# Patient Record
Sex: MALE | Race: WHITE | NOT HISPANIC OR LATINO | Employment: FULL TIME | URBAN - METROPOLITAN AREA
[De-identification: names, ages, dates, MRNs, and addresses within clinical notes are randomized per-mention and may not be internally consistent; named-entity substitution may affect disease eponyms.]

---

## 2020-03-13 ENCOUNTER — VIRTUAL VISIT (OUTPATIENT)
Dept: FAMILY MEDICINE | Facility: OTHER | Age: 35
End: 2020-03-13

## 2020-03-13 NOTE — PROGRESS NOTES
"Date: 2020 10:27:32  Clinician: Johnny Mortensen  Clinician NPI: [id number]  Patient: anderson rodas  Patient : 1985  Patient Address: [address] [place], [place] [address]  Patient Phone: [phone]  Visit Protocol: URI  Patient Summary:  anderson is a 34 year old ( : 1985 ) male who initiated a Visit for COVID-19 (Coronavirus) evaluation and screening. When asked the question \"Please sign me up to receive news, health information and promotions. \", anderson responded \"No\".    anderson states his symptoms started gradually 10-13 days ago. After his symptoms started, they improved and then got worse again.   His symptoms consist of chills, a cough, nasal congestion, tooth pain, malaise, a headache, rhinitis, and facial pain or pressure. He is experiencing mild difficulty breathing with activities but can speak normally in full sentences.   Symptom details     Nasal secretions: The color of his mucus is yellow.    Cough: anderson coughs every 5-10 minutes and his cough is more bothersome at night. Phlegm comes into his throat when he coughs. He believes his cough is caused by post-nasal drip. The color of the phlegm is yellow.     Facial pain or pressure: The facial pain or pressure does not feel worse when bending or leaning forward.     Headache: He states the headache is moderate (4-6 on a 10 point pain scale).     Tooth pain: The tooth pain is not caused by a cavity, recent dental work, or other mouth problems.      anderson denies having wheezing, sore throat, fever, ear pain, and myalgias. He also denies taking antibiotic medication for the symptoms, having recent facial or sinus surgery in the past 60 days, and having a sinus infection within the past year.   Precipitating events  He has not recently been exposed to someone with influenza. anderson has not been in close contact with any high risk individuals.   Pertinent COVID-19 (Coronavirus) information  anderson has traveled internationally or to the " areas where COVID-19 (Coronavirus) is widespread in the last 14 days before the start of his symptoms. Countries or locations traveled as reported by the patient (free text): [place] and [place]   anderson has not had close contact with a laboratory-confirmed positive COVID-19 patient or someone under quarantine for suspected COVID-19 within 14 days of symptom onset.   anderson is not a healthcare worker or does not work in a healthcare facility.   Pertinent medical history  anderson does not need a return to work/school note.   Weight: 220 lbs   anderson does not smoke or use smokeless tobacco.   Weight: 220 lbs    MEDICATIONS: No current medications, ALLERGIES: NKDA  Clinician Response:  Dear anderson,   Dear anderson,  Based on the information you have provided, it does not appear you need Coronavirus (COVID-19) testing at this time because you do not have fever and cough which are the primary symptoms of Coronavirus. But your possible exposure to Coronavirus means that we do recommend self-isolation for 14 days from the last day you may have been exposed.   What does this mean?  Isolate Yourself:   Isolate yourself at home.   Do Not allow any visitors  Do Not go to work or school  Do Not go to [employer],  centers, shopping, or other public places.  Do Not shake hands.  Avoid close contact with others (hugging, kissing).   Protect Others:   Cover Your Mouth and Nose with a mask, disposable tissue or wash cloth to avoid spreading germs to others.  Wash your hands and face frequently with soap and water.   If you have not developed a cough with fever by day 15 of isolation you are considered uninfected.  If you develop cough and fever, submit a new visit to us so we can arrange curbside COVID testing.   Thank you for limiting contact with others, wearing a simple mask to cover your cough, practice good hand hygiene habits and accessing our virtual services where possible to limit the spread of this virus.  For more  information about COVID19 and options for caring for yourself at home, please visit the CDC website at https://www.cdc.gov/coronavirus/2019-ncov/about/steps-when-sick.html  For more options for care at [facility], please visit our website at [email or web address]    COVID-19 (Coronavirus) General Information  With the increase in the number of COVID-19 (Coronavirus) cases, we understand you may have some questions. Below is some helpful information on COVID-19 (Coronavirus).  How can I protect myself and others from the COVID-19 (Coronavirus)?  Because there is currently no vaccine to prevent infection, the best way to protect yourself is to avoid being exposed to this virus. Put distance between yourself and other people if COVID-19 (Coronavirus) is spreading in your community. The virus is thought to spread mainly from person-to-person.     Between people who are in close contact with one another (within about 6 about) for prolonged period (10 minutes or longer).    Through respiratory droplets produced when an infected person coughs or sneezes.     The CDC recommends the following additional steps to protect yourself and others:     Wash your hands often with soap and water for at least 20 seconds, especially after blowing your nose, coughing, or sneezing; going to the bathroom; and before eating or preparing food.  Use an alcohol-based hand  that contains at least 60 percent alcohol if soap and water are not available.        Avoid touching your eyes, nose and mouth with unwashed hands.    Avoid close contact with people who are sick.    Stay home when you are sick.    Cover your cough or sneeze with a tissue, then throw the tissue in the trash.    Clean and disinfect frequently touched objects and surfaces.     You can help stop COVID-19 (Coronavirus) by knowing the signs and symptoms:     Fever    Cough    Shortness of breath     Contact your healthcare provider if    Develop symptoms   AND   Have been in close contact with a person known to have COVID-19 (Coronavirus) or live in or have recently traveled from an area with ongoing spread of COVID-19 (Coronavirus). Call ahead before you go to a doctor's office or emergency room. Tell them about your recent travel and your symptoms.   For the most up to date information, visit the CDC's website.  Steps to help prevent the spread of COVID-19 (Coronavirus) if you are sick  If you are sick with COVID-19 (Coronavirus) or suspect you are infected with the virus that causes COVID-19 (Coronavirus), follow the steps below to help prevent the disease from spreading&nbsp;to people in your home and community.     Stay home except to get medical care. Home isolation may be started in consultation with your healthcare clinician.    Separate yourself from other people and animals in your home.    Call ahead before visiting your doctor if you have a medical appointment.    Wear a facemask when you are around other people.    Cover your cough and sneezes.    Clean your hands often.    Avoid sharing personal household items.    Clean and disinfect frequently touched objects and surfaces everyday.    You will need to have someone drop off medications or household supplies (if needed) at your house without coming inside or in contact with you or others living in your house.    Monitor your symptoms and seek prompt medical care if your illness is worsening (e.g. Difficulty breathing).    Discontinue home isolation only in consultation with your healthcare provider.     For more detailed and up to date information on what to do if you are sick, visit this link: What to Do If You Are Sick With Coronavirus Disease 2019 (COVID-19).  Do I need to be tested for COVID-19 (Coronavirus)?     At this time, the limited number of tests available are controlled by the state and local health departments and are being reserved for more seriously ill patients, those  "with known exposure to confirmed patients, and those with recent travel (within 14 days) to countries with high rates of COVID-19 (Coronavirus).    Decisions on which patients receive testing will be based on the local spread of COVID-19 (Coronavirus) as well as the symptoms. Your healthcare provider will make the final decision on whether you should be tested.    In the meantime, if you have concerns that you may have been exposed, it is reasonable to practice \"social distancing.\"&nbsp; If you are ill with a cold or flu-like illness, please monitor your symptoms and reach out to your healthcare provider if your symptoms worsen.    For more up to date information, visit this link: COVID-19 (Coronavirus) Frequently Asked Questions and Answers.      Diagnosis: Acute upper respiratory infection, unspecified  Diagnosis ICD: J06.9  "

## 2021-06-05 ENCOUNTER — HEALTH MAINTENANCE LETTER (OUTPATIENT)
Age: 36
End: 2021-06-05

## 2021-09-25 ENCOUNTER — HEALTH MAINTENANCE LETTER (OUTPATIENT)
Age: 36
End: 2021-09-25

## 2022-02-23 ENCOUNTER — OFFICE VISIT (OUTPATIENT)
Dept: FAMILY MEDICINE | Facility: CLINIC | Age: 37
End: 2022-02-23
Payer: COMMERCIAL

## 2022-02-23 VITALS
SYSTOLIC BLOOD PRESSURE: 112 MMHG | DIASTOLIC BLOOD PRESSURE: 72 MMHG | WEIGHT: 230 LBS | BODY MASS INDEX: 33.97 KG/M2 | OXYGEN SATURATION: 99 % | HEART RATE: 72 BPM

## 2022-02-23 DIAGNOSIS — R06.83 SNORING: ICD-10-CM

## 2022-02-23 DIAGNOSIS — Z23 COVID-19 VACCINE ADMINISTERED: ICD-10-CM

## 2022-02-23 DIAGNOSIS — J01.90 ACUTE SINUSITIS WITH SYMPTOMS > 10 DAYS: Primary | ICD-10-CM

## 2022-02-23 PROCEDURE — 91305 COVID-19,PF,PFIZER (12+ YRS): CPT | Performed by: FAMILY MEDICINE

## 2022-02-23 PROCEDURE — 0054A COVID-19,PF,PFIZER (12+ YRS): CPT | Performed by: FAMILY MEDICINE

## 2022-02-23 PROCEDURE — 99204 OFFICE O/P NEW MOD 45 MIN: CPT | Performed by: FAMILY MEDICINE

## 2022-02-23 RX ORDER — DOXYCYCLINE HYCLATE 100 MG
100 TABLET ORAL 2 TIMES DAILY
Qty: 20 TABLET | Refills: 0 | Status: SHIPPED | OUTPATIENT
Start: 2022-02-23 | End: 2022-03-21

## 2022-02-23 NOTE — PROGRESS NOTES
Assessment & Plan     Acute sinusitis with symptoms > 10 days  Due to ongoing length of time, will trial treatment and see if improvement.  If not then plan for referral to ENT/CT if needed  - doxycycline hyclate (VIBRA-TABS) 100 MG tablet  Dispense: 20 tablet; Refill: 0    Snoring  Referral for sleep eval  - Adult Sleep Eval & Management  Referral    COVID-19 vaccine administered  - COVID-19,PF,PFIZER (12+ Yrs GRAY LABEL)                   Return in about 1 week (around 3/2/2022), or if symptoms worsen or fail to improve.    Celia Chadwick MD  [facility] JUNE Maddox is a 36 year old who presents for the following health issues     History of Present Illness     Reason for visit:  Having sinus issues for awhile and it getting worse not better. Would like to get it checked out.  Symptom onset:  More than a month  Symptoms include:  Blocked sinus  Symptom intensity:  Severe  Symptom progression:  Worsening  Had these symptoms before:  Yes  Has tried/received treatment for these symptoms:  No  What makes it worse:  Dry air  What makes it better:  No    He eats 2-3 servings of fruits and vegetables daily.He consumes 0 sweetened beverage(s) daily.He exercises with enough effort to increase his heart rate 10 to 19 minutes per day.  He exercises with enough effort to increase his heart rate 3 or less days per week.   He is taking medications regularly.     Ongoing for a while and feels like getting worse.  Unable to sleep at night.  Taking mucinex at night.  Gag reflex getting worse - like brushing teeth.  No teeth pain.  + snoring.  Wife tells him stops breathing at times.  When blowing nose will depend on the time of the day - hard in the morning and using saline nasal spray.  Green in color.  No hx of allergies.  Occasionally waking up tired.  Congestion feels like in the maxillary area.  Some pressure.            Review of Systems   Constitutional, HEENT, cardiovascular,  pulmonary, gi and gu systems are negative, except as otherwise noted.      Objective    /72 (BP Location: Right arm, Patient Position: Sitting, Cuff Size: Adult Regular)   Pulse 72   Wt 104.3 kg (230 lb)   SpO2 99%   BMI 33.97 kg/m    Body mass index is 33.97 kg/m .  Physical Exam   GENERAL: healthy, alert and no distress  HENT: ear canals and TM's normal, nose and mouth without ulcers or lesions, turbinates appear enlarged but not boggy   NECK: no adenopathy, no asymmetry, masses, or scars and thyroid normal to palpation  RESP: lungs clear to auscultation - no rales, rhonchi or wheezes  MS: no gross musculoskeletal defects noted, no edema

## 2022-03-09 ENCOUNTER — MYC MEDICAL ADVICE (OUTPATIENT)
Dept: FAMILY MEDICINE | Facility: CLINIC | Age: 37
End: 2022-03-09
Payer: COMMERCIAL

## 2022-03-21 ENCOUNTER — VIRTUAL VISIT (OUTPATIENT)
Dept: FAMILY MEDICINE | Facility: CLINIC | Age: 37
End: 2022-03-21
Payer: COMMERCIAL

## 2022-03-21 DIAGNOSIS — J01.90 SUBACUTE SINUSITIS, UNSPECIFIED LOCATION: Primary | ICD-10-CM

## 2022-03-21 PROCEDURE — 99214 OFFICE O/P EST MOD 30 MIN: CPT | Mod: 95 | Performed by: FAMILY MEDICINE

## 2022-03-21 RX ORDER — FLUTICASONE PROPIONATE 50 MCG
2 SPRAY, SUSPENSION (ML) NASAL 2 TIMES DAILY
Qty: 16 G | Refills: 1 | Status: SHIPPED | OUTPATIENT
Start: 2022-03-21 | End: 2022-08-12

## 2022-03-21 NOTE — PROGRESS NOTES
Tan is a 36 year old who is being evaluated via a billable video visit.      How would you like to obtain your AVS? MyChart  If the video visit is dropped, the invitation should be resent by: Text to cell phone: [phone]    Will anyone else be joining your video visit? No      Video Start Time: 10:41 AM    Assessment & Plan     (J01.90) Subacute sinusitis, unspecified location  (primary encounter diagnosis)  Comment: tx options reviewed     Plan: amoxicillin-clavulanate (AUGMENTIN) 875-125 MG         tablet, fluticasone (FLONASE) 50 MCG/ACT nasal         spray                           No follow-ups on file.    Peri Haas MD  [facility]    Subjective   Tan is a 36 year old who presents for the following health issues     HPI    Follow up on sinus issues.  He was diagnosed with acute sinusitis back in February 2022, and prescribed doxycycline with improving symptoms, but returning soon after done with the antibiotics with having a Hard to breath, through the nose, clogged, and Always congested     He has also been using the OTC nasal sprays intermittently.       Review of Systems         Objective         Vitals:  No vitals were obtained today due to virtual visit.    Physical Exam   GENERAL: Healthy, alert and no distress                Video-Visit Details    Type of service:  Video Visit    Video End Time:10:45 AM    Originating Location (pt. Location): Home    Distant Location (provider location):  [facility]     Platform used for Video Visit: Byron

## 2022-04-06 ENCOUNTER — OFFICE VISIT (OUTPATIENT)
Dept: FAMILY MEDICINE | Facility: CLINIC | Age: 37
End: 2022-04-06
Payer: COMMERCIAL

## 2022-04-06 VITALS
BODY MASS INDEX: 32.35 KG/M2 | RESPIRATION RATE: 16 BRPM | SYSTOLIC BLOOD PRESSURE: 112 MMHG | TEMPERATURE: 98 F | WEIGHT: 231.1 LBS | HEART RATE: 66 BPM | HEIGHT: 71 IN | DIASTOLIC BLOOD PRESSURE: 80 MMHG

## 2022-04-06 DIAGNOSIS — J01.90 SUBACUTE SINUSITIS, UNSPECIFIED LOCATION: Primary | ICD-10-CM

## 2022-04-06 PROCEDURE — 99214 OFFICE O/P EST MOD 30 MIN: CPT | Performed by: FAMILY MEDICINE

## 2022-04-06 RX ORDER — PREDNISONE 20 MG/1
20 TABLET ORAL DAILY
Qty: 7 TABLET | Refills: 0 | Status: SHIPPED | OUTPATIENT
Start: 2022-04-06 | End: 2022-04-13

## 2022-04-06 NOTE — PROGRESS NOTES
"  Assessment & Plan     (J01.90) Subacute sinusitis, unspecified location  (primary encounter diagnosis)  Comment: Status post tx with Augmentin and Flonase   have been noticing some improvement on flonase.   Plan: predniSONE (DELTASONE) 20 MG tablet        X7 days.   Close follow up if no significant change or improvement as anticipated.          30 minutes spent on the date of the encounter doing chart review, patient visit and documentation              No follow-ups on file.        Subjective   Tan Florence is a 36 year old male comes in follow up of recently treated sinusitis with augmentin and flonase.  He notes that the symptoms were temporarily relieved, and still feeling congested and seeing wax and waning relieve using the Flonase.       Review of Systems         Objective    /80 (BP Location: Right arm, Patient Position: Sitting, Cuff Size: Adult Large)   Pulse 66   Temp 98  F (36.7  C) (Oral)   Resp 16   Ht 1.803 m (5' 11\")   Wt 104.8 kg (231 lb 1.6 oz)   BMI 32.23 kg/m    Body mass index is 32.23 kg/m .     Physical Exam               Peri Haas MD  [facility]  Answers for HPI/ROS submitted by the patient on 4/6/2022  How many servings of fruits and vegetables do you eat daily?: 2-3  On average, how many sweetened beverages do you drink each day (Examples: soda, juice, sweet tea, etc.  Do NOT count diet or artificially sweetened beverages)?: 0  How many minutes a day do you exercise enough to make your heart beat faster?: 20 to 29  How many days a week do you exercise enough to make your heart beat faster?: 4  How many days per week do you miss taking your medication?: 0  What is the reason for your visit today?: Sinus issues  When did your symptoms begin?: More than a month  What are your symptoms?: Blocked sinuses and hard to breathe through nose. Really bad gag reflex  How would you describe these symptoms?: Severe  Are your symptoms:: " Worsening  Have you had these symptoms before?: Yes  Have you tried or received treatment for these symptoms before?: Yes  Did that treatment work? : No  Is there anything that makes you feel worse?: Nothing comes to mind  Is there anything that makes you feel better?: While on antibiotics symptoms got better but immediately returned when prescription ended

## 2022-04-14 ENCOUNTER — MYC MEDICAL ADVICE (OUTPATIENT)
Dept: FAMILY MEDICINE | Facility: CLINIC | Age: 37
End: 2022-04-14
Payer: COMMERCIAL

## 2022-04-14 DIAGNOSIS — J01.90 SUBACUTE SINUSITIS, UNSPECIFIED LOCATION: Primary | ICD-10-CM

## 2022-04-25 ENCOUNTER — HOSPITAL ENCOUNTER (OUTPATIENT)
Dept: CT IMAGING | Facility: HOSPITAL | Age: 37
Discharge: HOME | End: 2022-04-25
Attending: FAMILY MEDICINE | Admitting: FAMILY MEDICINE
Payer: COMMERCIAL

## 2022-04-25 PROCEDURE — 70486 CT MAXILLOFACIAL W/O DYE: CPT

## 2022-06-12 ENCOUNTER — APPOINTMENT (OUTPATIENT)
Dept: FAMILY MEDICINE | Facility: CLINIC | Age: 37
End: 2022-06-12
Payer: COMMERCIAL

## 2022-06-15 NOTE — TELEPHONE ENCOUNTER
Patient viewed mychart message regarding sinus symptoms and the need to scheduled an appointment, see 3/9/22 mychart encounter that patient restarted regarding his sinus concerns.  Will close chart at this time.

## 2022-07-02 ENCOUNTER — HEALTH MAINTENANCE LETTER (OUTPATIENT)
Age: 37
End: 2022-07-02

## 2022-08-12 ENCOUNTER — OFFICE VISIT (OUTPATIENT)
Dept: FAMILY MEDICINE | Facility: CLINIC | Age: 37
End: 2022-08-12
Payer: COMMERCIAL

## 2022-08-12 VITALS
SYSTOLIC BLOOD PRESSURE: 144 MMHG | WEIGHT: 229.4 LBS | HEIGHT: 71 IN | HEART RATE: 74 BPM | RESPIRATION RATE: 18 BRPM | BODY MASS INDEX: 32.11 KG/M2 | OXYGEN SATURATION: 98 % | DIASTOLIC BLOOD PRESSURE: 94 MMHG | TEMPERATURE: 98.6 F

## 2022-08-12 DIAGNOSIS — F43.23 SITUATIONAL MIXED ANXIETY AND DEPRESSIVE DISORDER: ICD-10-CM

## 2022-08-12 DIAGNOSIS — Z00.01 ENCOUNTER FOR ROUTINE ADULT MEDICAL EXAM WITH ABNORMAL FINDINGS: Primary | ICD-10-CM

## 2022-08-12 DIAGNOSIS — Z23 NEED FOR VACCINATION: ICD-10-CM

## 2022-08-12 DIAGNOSIS — Z13.6 CARDIOVASCULAR SCREENING; LDL GOAL LESS THAN 160: ICD-10-CM

## 2022-08-12 DIAGNOSIS — J32.9 CHRONIC CONGESTION OF PARANASAL SINUS: ICD-10-CM

## 2022-08-12 DIAGNOSIS — R03.0 ELEVATED BLOOD PRESSURE READING WITHOUT DIAGNOSIS OF HYPERTENSION: ICD-10-CM

## 2022-08-12 PROCEDURE — 99214 OFFICE O/P EST MOD 30 MIN: CPT | Mod: 25 | Performed by: PHYSICIAN ASSISTANT

## 2022-08-12 PROCEDURE — 90471 IMMUNIZATION ADMIN: CPT | Performed by: PHYSICIAN ASSISTANT

## 2022-08-12 PROCEDURE — 99395 PREV VISIT EST AGE 18-39: CPT | Mod: 25 | Performed by: PHYSICIAN ASSISTANT

## 2022-08-12 PROCEDURE — 90715 TDAP VACCINE 7 YRS/> IM: CPT | Performed by: PHYSICIAN ASSISTANT

## 2022-08-12 RX ORDER — AZELASTINE 1 MG/ML
1 SPRAY, METERED NASAL 2 TIMES DAILY
Qty: 30 ML | Refills: 0 | Status: SHIPPED | OUTPATIENT
Start: 2022-08-12 | End: 2023-02-22

## 2022-08-12 ASSESSMENT — ENCOUNTER SYMPTOMS
HEMATOCHEZIA: 0
EYE PAIN: 0
ABDOMINAL PAIN: 0
FEVER: 0
CONSTIPATION: 0
ARTHRALGIAS: 0
NERVOUS/ANXIOUS: 1
MYALGIAS: 0
HEADACHES: 0
PALPITATIONS: 0
JOINT SWELLING: 0
CHILLS: 0
PARESTHESIAS: 0
HEMATURIA: 0
DYSURIA: 0
DIZZINESS: 0
FREQUENCY: 0
SHORTNESS OF BREATH: 1
SORE THROAT: 0
HEARTBURN: 0
DIARRHEA: 0
COUGH: 0
WEAKNESS: 0
NAUSEA: 0

## 2022-08-12 ASSESSMENT — PAIN SCALES - GENERAL: PAINLEVEL: NO PAIN (0)

## 2022-08-12 NOTE — PATIENT INSTRUCTIONS
[facility] will call you to coordinate your care as prescribed by the provider. If you don t hear from a representative within 2 business days, please call [phone].

## 2022-08-12 NOTE — PROGRESS NOTES
"SUBJECTIVE:   CC: Tan Florence is an 36 year old male who presents for preventative health visit.     Patient has been advised of split billing requirements and indicates understanding: Yes  Healthy Habits:     Getting at least 3 servings of Calcium per day:  Yes    Bi-annual eye exam:  NO    Dental care twice a year:  Yes    Sleep apnea or symptoms of sleep apnea:  Excessive snoring    Diet:  Regular (no restrictions)    Frequency of exercise:  1 day/week    Duration of exercise:  15-30 minutes    Taking medications regularly:  Yes    Medication side effects:  Not applicable    PHQ-2 Total Score: 1    Additional concerns today:  Yes    Nasal congestion --   Normal allergy/congestion prior to Feb of this year but nothing like he has been having for the last 6 months  Has been on 2 antibiotics as well as prednisone   No significant change on the prednisone  Did feel like he had some improvement on the antibiotics but as soon as he completed them his symptoms return    Anxiety--  Has been under significant stress at work - 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  Also expecting his first baby in a few months  He didn't realize he was as bad as he was but his wife has noticed and says he has \"not been himself'  Denies a history of anxiety or depression  Has not been on any medications in the past  Used to work out and admits he was much healthier but that has fallen to the wayside with the recent stressors        Today's PHQ-2 Score:   PHQ-2 ( 1999 Pfizer) 8/12/2022   Q1: Little interest or pleasure in doing things 0   Q2: Feeling down, depressed or hopeless 1   PHQ-2 Score 1   Q1: Little interest or pleasure in doing things Not at all   Q2: Feeling down, depressed or hopeless Several days   PHQ-2 Score 1       Abuse: Current or Past(Physical, Sexual or Emotional)- No  Do you feel safe in your environment? Yes    Have you ever done Advance Care Planning? " (For example, a Health Directive, POLST, or a discussion with a medical provider or your loved ones about your wishes): No, advance care planning information given to patient to review.  Patient declined advance care planning discussion at this time.    Social History     Tobacco Use     Smoking status: Never Smoker     Smokeless tobacco: Never Used   Substance Use Topics     Alcohol use: Yes     Alcohol/week: 5.0 standard drinks     Types: 6 drink(s) per week       Alcohol Use 8/12/2022   Prescreen: >3 drinks/day or >7 drinks/week? Yes   AUDIT SCORE  8     Last PSA: No results found for: PSA    Reviewed orders with patient. Reviewed health maintenance and updated orders accordingly - Yes  BP Readings from Last 3 Encounters:   08/12/22 (!) 144/94   04/06/22 112/80   02/23/22 112/72    Wt Readings from Last 3 Encounters:   08/12/22 104.1 kg (229 lb 6.4 oz)   04/06/22 104.8 kg (231 lb 1.6 oz)   02/23/22 104.3 kg (230 lb)                    Reviewed and updated as needed this visit by clinical staff   Tobacco  Allergies  Meds   Med Hx  Surg Hx  Fam Hx  Soc Hx          Reviewed and updated as needed this visit by Provider                       Review of Systems   Constitutional: Negative for chills and fever.   HENT: Positive for congestion. Negative for ear pain, hearing loss and sore throat.    Eyes: Negative for pain and visual disturbance.   Respiratory: Positive for shortness of breath. Negative for cough.    Cardiovascular: Negative for chest pain, palpitations and peripheral edema.   Gastrointestinal: Negative for abdominal pain, constipation, diarrhea, heartburn, hematochezia and nausea.   Genitourinary: Negative for dysuria, frequency, genital sores, hematuria and urgency.   Musculoskeletal: Negative for arthralgias, joint swelling and myalgias.   Skin: Negative for rash.   Neurological: Negative for dizziness, weakness, headaches and paresthesias.   Psychiatric/Behavioral: Positive for mood changes. The  "patient is nervous/anxious.          OBJECTIVE:   BP (!) 144/94   Pulse 74   Temp 98.6  F (37  C) (Tympanic)   Resp 18   Ht 1.803 m (5' 11\")   Wt 104.1 kg (229 lb 6.4 oz)   SpO2 98%   BMI 31.99 kg/m      Physical Exam  GENERAL: healthy, alert and no distress  EYES: Eyes grossly normal to inspection, PERRL and conjunctivae and sclerae normal  HENT: ear canals and TM's normal, nose and mouth without ulcers or lesions  NECK: no adenopathy, no asymmetry, masses, or scars and thyroid normal to palpation  RESP: lungs clear to auscultation - no rales, rhonchi or wheezes  CV: regular rate and rhythm, normal S1 S2, no S3 or S4, no murmur, click or rub, no peripheral edema and peripheral pulses strong  ABDOMEN: soft, nontender, no hepatosplenomegaly, no masses and bowel sounds normal  MS: no gross musculoskeletal defects noted, no edema  SKIN: no suspicious lesions or rashes  NEURO: Normal strength and tone, mentation intact and speech normal  PSYCH: mentation appears normal, affect normal, somewhat restless/fidgety in his seat, eye contact fair but lots of movement/darting around    Diagnostic Test Results:  Labs ordered to be collected at a future date    ASSESSMENT/PLAN:   (Z00.01) Encounter for routine adult medical exam with abnormal findings  (primary encounter diagnosis)  Comment:   Plan: Lipid panel reflex to direct LDL Non-fasting,         Glucose            (Z13.6) CARDIOVASCULAR SCREENING; LDL GOAL LESS THAN 160  Comment:   Plan: Lipid panel reflex to direct LDL Non-fasting,         Glucose            (J32.9) Chronic congestion of paranasal sinus  Comment: persistent despite 2 rounds of abx and prednisone. Did have a CT that was essentially unremarkable, however this was done after he had been treated so wonder if it looked better than it actually is. Would like him to see ENT. Will try astelin in the interim  Plan: Adult ENT  Referral, azelastine         (ASTELIN) 0.1 % nasal spray        " "    (F43.23) Situational mixed anxiety and depressive disorder  Comment: new in the last few months - no prior history. Discussed anxiety/depression, treatment, and possible side effects. I stressed trying to improve some of the things he can do on his own such as eating healthy, sleeping, and exercise. He admits exercise has helped but he doesn't see this being something he will be able to do easily in the next few months. We discussed medications in detail - although he is not sure he wants to start this, we agreed we would send in a prescription so he can think about it and start it if he feels this is something he wants to do assuming the next several months are going to remain stressful  Plan: sertraline (ZOLOFT) 50 MG tablet            (Z23) Need for vaccination  Comment:   Plan: TDAP VACCINE (Adacel, Boostrix)  [[id number]]            (R03.0) Elevated blood pressure reading without diagnosis of hypertension  Comment: repeat elevated. Does not check outsie of clinic. WEight up from his normal so likely a factor and discussed that even a small amoutn of weight loss could drop his blood pressure.   Plan: Will return for blood pressure checks to get a few more readings. Discussed goal of <140/90 and if readings remain at or above this, would advise starting a blood pressure medication that can always be stopped in the future if he does lose the extra weight      Patient has been advised of split billing requirements and indicates understanding: Yes    COUNSELING:   Reviewed preventive health counseling, as reflected in patient instructions    Estimated body mass index is 31.99 kg/m  as calculated from the following:    Height as of this encounter: 1.803 m (5' 11\").    Weight as of this encounter: 104.1 kg (229 lb 6.4 oz).     Weight management plan: Discussed healthy diet and exercise guidelines    He reports that he has never smoked. He has never used smokeless tobacco.      Counseling Resources:  ATP IV " Guidelines  Pooled Cohorts Equation Calculator  FRAX Risk Assessment  ICSI Preventive Guidelines  Dietary Guidelines for Americans, 2010  USDA's MyPlate  ASA Prophylaxis  Lung CA Screening    JOSÉ MIGUEL De La [facility]

## 2023-01-19 NOTE — PROGRESS NOTES
X CHIEF COMPLAINT: Patient presents with:  Nasal Congestion: Chronic nasal congestion for years not sure if he has allergies, he does take flonase and azelastine and this provide a little relief does take sudafed daily           HISTORY OF PRESENT ILLNESS    Tan was seen at the behest of Norris De Anda PA-C for chronic nasal congestion.    Referral note:      Z00.01) Encounter for routine adult medical exam with abnormal findings  (primary encounter diagnosis)  Comment:   Plan: Lipid panel reflex to direct LDL Non-fasting,         Glucose             (Z13.6) CARDIOVASCULAR SCREENING; LDL GOAL LESS THAN 160  Comment:   Plan: Lipid panel reflex to direct LDL Non-fasting,         Glucose             (J32.9) Chronic congestion of paranasal sinus  Comment: persistent despite 2 rounds of abx and prednisone. Did have a CT that was essentially unremarkable, however this was done after he had been treated so wonder if it looked better than it actually is. Would like him to see ENT. Will try astelin in the interim  Plan: Adult ENT  Referral, azelastine         (ASTELIN) 0.1 % nasal spray                    REVIEW OF SYSTEMS    Review of Systems as per HPI and PMHx, otherwise 10 system review system are negative.       ALLERGIES    Patient has no known allergies.    CURRENT MEDICATIONS      Current Outpatient Medications:      azelastine (ASTELIN) 0.1 % nasal spray, Spray 1 spray into both nostrils 2 times daily, Disp: 30 mL, Rfl: 0     fluticasone (FLONASE) 50 MCG/ACT nasal spray, Spray 1 spray into both nostrils daily, Disp: , Rfl:      pantoprazole (PROTONIX) 40 MG EC tablet, Take 1 tablet (40 mg) by mouth daily for 90 days, Disp: 90 tablet, Rfl: 0     pseudoePHEDrine (SUDAFED) 120 MG 12 hr tablet, Take 120 mg by mouth every 12 hours, Disp: , Rfl:      sertraline (ZOLOFT) 50 MG tablet, Take 1/2 tab (25mg) daily for 1-2 weeks then increase to 1 full tab for 1-2 weeks then increase to 1.5 tabs or 2 tabs daily,  Disp: 60 tablet, Rfl: 1     PAST MEDICAL HISTORY    PAST MEDICAL HISTORY:   Past Medical History:   Diagnosis Date     NO ACTIVE PROBLEMS 6/16/2008       PAST SURGICAL HISTORY    PAST SURGICAL HISTORY:   Past Surgical History:   Procedure Laterality Date     NO HISTORY OF SURGERY         FAMILY  HISTORY    FAMILY HISTORY:   Family History   Problem Relation Age of Onset     Asthma No family hx of      C.A.D. No family hx of      Diabetes No family hx of      Hypertension No family hx of      Cancer - colorectal No family hx of      Prostate Cancer No family hx of        SOCIAL HISTORY    SOCIAL HISTORY:   Social History     Tobacco Use     Smoking status: Never     Smokeless tobacco: Never   Substance Use Topics     Alcohol use: Yes     Alcohol/week: 5.0 standard drinks     Types: 6 drink(s) per week        PHYSICAL EXAM    HEAD: Normal appearance and symmetry:  No cutaneous lesions.      NECK:  supple     EARS: normal TM, EACs    EYES:  EOMI    CN VII/XII:  intact     NOSE:     Dorsum:   straight  Septum:  deviated left  Mucosa:  moist        ORAL CAVITY/OROPHARYNX:     Lips:  Normal.  Tongue: normal, midline  Mucosa:   no lesions     NECK:  Trachea:  midline.              Thyroid:  normal              Adenopathy:  none        NEURO:   Alert and Oriented     GAIT AND STATION:  normal     RESPIRATORY:   Symmetry and Respiratory effort     PSYCH:  Normal mood and affect     SKIN:   warm and dry   .        FLEX LARYNGOSCOPY:    After obtaining consent, a flexible laryngoscope is used to examine both nasal cavities, the nasopharynx, pharnx, and larynx.      Nasal cavity: wnl  NP: wnl  Pharnx: cobblestoning of mucosa  Larynx: thickening of PC; TVCs sharp          IMPRESSION:    Encounter Diagnoses   Name Primary?     Gastroesophageal reflux disease, unspecified whether esophagitis present Yes     Choking due to phlegm in larynx, initial encounter           RECOMMENDATIONS:      Orders Placed This Encounter   Procedures      XR Esophagram     Adult General Surg Referral      Orders Placed This Encounter   Medications     fluticasone (FLONASE) 50 MCG/ACT nasal spray     Sig: Spray 1 spray into both nostrils daily     pseudoePHEDrine (SUDAFED) 120 MG 12 hr tablet     Sig: Take 120 mg by mouth every 12 hours     pantoprazole (PROTONIX) 40 MG EC tablet     Sig: Take 1 tablet (40 mg) by mouth daily for 90 days     Dispense:  90 tablet     Refill:  0

## 2023-01-20 ENCOUNTER — OFFICE VISIT (OUTPATIENT)
Dept: OTOLARYNGOLOGY | Facility: CLINIC | Age: 38
End: 2023-01-20
Payer: COMMERCIAL

## 2023-01-20 DIAGNOSIS — K21.9 GASTROESOPHAGEAL REFLUX DISEASE, UNSPECIFIED WHETHER ESOPHAGITIS PRESENT: Primary | ICD-10-CM

## 2023-01-20 DIAGNOSIS — T17.310A CHOKING DUE TO PHLEGM IN LARYNX, INITIAL ENCOUNTER: ICD-10-CM

## 2023-01-20 PROCEDURE — 99204 OFFICE O/P NEW MOD 45 MIN: CPT | Mod: 25 | Performed by: OTOLARYNGOLOGY

## 2023-01-20 PROCEDURE — 31575 DIAGNOSTIC LARYNGOSCOPY: CPT | Performed by: OTOLARYNGOLOGY

## 2023-01-20 RX ORDER — FLUTICASONE PROPIONATE 50 MCG
1 SPRAY, SUSPENSION (ML) NASAL DAILY
COMMUNITY
End: 2023-03-24

## 2023-01-20 RX ORDER — PANTOPRAZOLE SODIUM 40 MG/1
40 TABLET, DELAYED RELEASE ORAL DAILY
Qty: 90 TABLET | Refills: 0 | Status: SHIPPED | OUTPATIENT
Start: 2023-01-20 | End: 2023-04-20

## 2023-01-20 RX ORDER — PSEUDOEPHEDRINE HCL 120 MG/1
120 TABLET, FILM COATED, EXTENDED RELEASE ORAL EVERY 12 HOURS
COMMUNITY
End: 2023-03-24

## 2023-01-20 NOTE — LETTER
1/20/2023         RE: Tan Florence  [address] [facility] [address]        Dear Colleague,    Thank you for referring your patient, Tan Florence, to the [facility]. Please see a copy of my visit note below.    X CHIEF COMPLAINT: Patient presents with:  Nasal Congestion: Chronic nasal congestion for years not sure if he has allergies, he does take flonase and azelastine and this provide a little relief does take sudafed daily           HISTORY OF PRESENT ILLNESS    Tan was seen at the behest of [facility] Divyaing JOSÉ MIGUEL for chronic nasal congestion.    Referral note:      Z00.01) Encounter for routine adult medical exam with abnormal findings  (primary encounter diagnosis)  Comment:   Plan: Lipid panel reflex to direct LDL Non-fasting,         Glucose             (Z13.6) CARDIOVASCULAR SCREENING; LDL GOAL LESS THAN 160  Comment:   Plan: Lipid panel reflex to direct LDL Non-fasting,         Glucose             (J32.9) Chronic congestion of paranasal sinus  Comment: persistent despite 2 rounds of abx and prednisone. Did have a CT that was essentially unremarkable, however this was done after he had been treated so wonder if it looked better than it actually is. Would like him to see ENT. Will try astelin in the interim  Plan: Adult ENT  Referral, azelastine         (ASTELIN) 0.1 % nasal spray                    REVIEW OF SYSTEMS    Review of Systems as per HPI and PMHx, otherwise 10 system review system are negative.       ALLERGIES    Patient has no known allergies.    CURRENT MEDICATIONS      Current Outpatient Medications:      azelastine (ASTELIN) 0.1 % nasal spray, Spray 1 spray into both nostrils 2 times daily, Disp: 30 mL, Rfl: 0     fluticasone (FLONASE) 50 MCG/ACT nasal spray, Spray 1 spray into both nostrils daily, Disp: , Rfl:      pantoprazole (PROTONIX) 40 MG EC tablet, Take 1 tablet (40 mg) by mouth daily for 90 days, Disp: 90 tablet, Rfl:  0     pseudoePHEDrine (SUDAFED) 120 MG 12 hr tablet, Take 120 mg by mouth every 12 hours, Disp: , Rfl:      sertraline (ZOLOFT) 50 MG tablet, Take 1/2 tab (25mg) daily for 1-2 weeks then increase to 1 full tab for 1-2 weeks then increase to 1.5 tabs or 2 tabs daily, Disp: 60 tablet, Rfl: 1     PAST MEDICAL HISTORY    PAST MEDICAL HISTORY:   Past Medical History:   Diagnosis Date     NO ACTIVE PROBLEMS 6/16/2008       PAST SURGICAL HISTORY    PAST SURGICAL HISTORY:   Past Surgical History:   Procedure Laterality Date     NO HISTORY OF SURGERY         FAMILY  HISTORY    FAMILY HISTORY:   Family History   Problem Relation Age of Onset     Asthma No family hx of      C.A.D. No family hx of      Diabetes No family hx of      Hypertension No family hx of      Cancer - colorectal No family hx of      Prostate Cancer No family hx of        SOCIAL HISTORY    SOCIAL HISTORY:   Social History     Tobacco Use     Smoking status: Never     Smokeless tobacco: Never   Substance Use Topics     Alcohol use: Yes     Alcohol/week: 5.0 standard drinks     Types: 6 drink(s) per week        PHYSICAL EXAM    HEAD: Normal appearance and symmetry:  No cutaneous lesions.      NECK:  supple     EARS: normal TM, EACs    EYES:  EOMI    CN VII/XII:  intact     NOSE:     Dorsum:   straight  Septum:  deviated left  Mucosa:  moist        ORAL CAVITY/OROPHARYNX:     Lips:  Normal.  Tongue: normal, midline  Mucosa:   no lesions     NECK:  Trachea:  midline.              Thyroid:  normal              Adenopathy:  none        NEURO:   Alert and Oriented     GAIT AND STATION:  normal     RESPIRATORY:   Symmetry and Respiratory effort     PSYCH:  Normal mood and affect     SKIN:   warm and dry   .        FLEX LARYNGOSCOPY:    After obtaining consent, a flexible laryngoscope is used to examine both nasal cavities, the nasopharynx, pharnx, and larynx.      Nasal cavity: wnl  NP: wnl  Pharnx: cobblestoning of mucosa  Larynx: thickening of PC; TVCs  sharp          IMPRESSION:    Encounter Diagnoses   Name Primary?     Gastroesophageal reflux disease, unspecified whether esophagitis present Yes     Choking due to phlegm in larynx, initial encounter           RECOMMENDATIONS:      Orders Placed This Encounter   Procedures     XR Esophagram     Adult General Surg Referral      Orders Placed This Encounter   Medications     fluticasone (FLONASE) 50 MCG/ACT nasal spray     Sig: Spray 1 spray into both nostrils daily     pseudoePHEDrine (SUDAFED) 120 MG 12 hr tablet     Sig: Take 120 mg by mouth every 12 hours     pantoprazole (PROTONIX) 40 MG EC tablet     Sig: Take 1 tablet (40 mg) by mouth daily for 90 days     Dispense:  90 tablet     Refill:  0           Again, thank you for allowing me to participate in the care of your patient.        Sincerely,        Harris Dixon MD

## 2023-01-20 NOTE — PATIENT INSTRUCTIONS
Acid Suppression Treatment    Protonix as directed    You are being  referred to Dr. Sumit David, a reflux specialist.       Lifestyle changes:    Avoid eating 2-3 hours before bedtime.   You may find it helpful to elevate the head of your bed.     Avoid following foods that are likely to trigger acid reflux:    Coffee or tea (try LOW ACID coffee or herbal tea)  Anything that s fizzy or has caffeine in it  Alcohol   Citrus fruits, such as oranges and maricruz  Tomato based foods (salsa, pizza, lasanga)  Chocolate   Mint or peppermint  Fatty foods (ice cream)  Spicy foods  Onions and garlic

## 2023-01-20 NOTE — NURSING NOTE
Sino-Nasal Outcome Test (SNOT - 22)    1. Need to Blow Nose: (P) Severe  2. Nasal Blockage: (P) Bad as it can be  3. Sneezing: (P) Moderate  4. Runny Nose: (P) Moderate  5. Cough: (P) Severe  6. Post-nasal discharge: (P) Severe  7. Thick nasal discharge: (P) Severe  8. Ear fullness: (P) Very mild  9. Dizziness: (P) None  10. Ear Pain: (P) None  11. Facial pain/pressure: (P) Mild or slight  12. Decreased Sense of Smell/Taste: (P) Very mild  13. Difficulty falling asleep: (P) Bad as it can be  14. Wake up at night: (P) Bad as it can be  15. Lack of a good night's sleep: (P) Bad as it can be  16. Wake up tired: (P) Bad as it can be  17. Fatigue: (P) Moderate  18. Reduced Productivity: (P) Moderate  19. Reduced Concentration: (P) Moderate  20. Frustrated/restless/irritable: (P) Severe  21. Sad: (P) None  22. Embarrassed: (P) None    Total Score: (P) 64    COPYRIGHT 1996. [facility] [place] . [facility],[place]    Rhonda Nice on 1/20/2023 at 9:53 AM

## 2023-02-22 ENCOUNTER — MYC REFILL (OUTPATIENT)
Dept: FAMILY MEDICINE | Facility: CLINIC | Age: 38
End: 2023-02-22
Payer: COMMERCIAL

## 2023-02-22 DIAGNOSIS — J32.9 CHRONIC CONGESTION OF PARANASAL SINUS: ICD-10-CM

## 2023-02-24 RX ORDER — AZELASTINE 1 MG/ML
1 SPRAY, METERED NASAL 2 TIMES DAILY
Qty: 30 ML | Refills: 0 | Status: SHIPPED | OUTPATIENT
Start: 2023-02-24 | End: 2023-06-30

## 2023-03-10 ENCOUNTER — HOSPITAL ENCOUNTER (OUTPATIENT)
Dept: RADIOLOGY | Facility: HOSPITAL | Age: 38
Discharge: HOME | End: 2023-03-10
Attending: OTOLARYNGOLOGY | Admitting: OTOLARYNGOLOGY
Payer: COMMERCIAL

## 2023-03-10 DIAGNOSIS — K21.9 GASTROESOPHAGEAL REFLUX DISEASE, UNSPECIFIED WHETHER ESOPHAGITIS PRESENT: ICD-10-CM

## 2023-03-10 PROCEDURE — 74221 X-RAY XM ESOPHAGUS 2CNTRST: CPT

## 2023-03-22 ENCOUNTER — TELEPHONE (OUTPATIENT)
Dept: OTOLARYNGOLOGY | Facility: CLINIC | Age: 38
End: 2023-03-22
Payer: COMMERCIAL

## 2023-03-22 NOTE — TELEPHONE ENCOUNTER
Called and left a VM for pt to call back about results.    [facility]      Filomena Irizarry RN  [facility]  ENT  [facility] [address]  [email or web address]   Office:[phone]  Employed by [employer] [facility]

## 2023-03-24 PROBLEM — F10.90 HABITUAL ALCOHOL USE: Status: ACTIVE | Noted: 2023-03-24

## 2023-03-24 PROBLEM — G47.33 OBSTRUCTIVE SLEEP APNEA SYNDROME: Status: ACTIVE | Noted: 2023-03-24

## 2023-03-24 PROBLEM — E66.3 OVERWEIGHT (BMI 25.0-29.9): Status: ACTIVE | Noted: 2023-03-24

## 2023-03-24 PROBLEM — K52.9 COLITIS: Status: ACTIVE | Noted: 2023-03-24

## 2023-03-24 PROBLEM — J31.0 CHRONIC RHINITIS: Status: ACTIVE | Noted: 2018-03-16

## 2023-03-24 PROBLEM — E78.5 HYPERLIPIDEMIA: Status: ACTIVE | Noted: 2023-03-24

## 2023-03-24 NOTE — TELEPHONE ENCOUNTER
Called pt and left a VM to call back about results.    [facility]      Filomena Irizarry RN  [facility]  ENT  [address] [address]  [email or web address]   Office:[phone]  Employed by [facility]

## 2023-03-27 ENCOUNTER — OFFICE VISIT (OUTPATIENT)
Dept: SURGERY | Facility: CLINIC | Age: 38
End: 2023-03-27
Attending: OTOLARYNGOLOGY
Payer: COMMERCIAL

## 2023-03-27 VITALS — BODY MASS INDEX: 31.73 KG/M2 | WEIGHT: 227.5 LBS

## 2023-03-27 DIAGNOSIS — K21.9 GASTROESOPHAGEAL REFLUX DISEASE, UNSPECIFIED WHETHER ESOPHAGITIS PRESENT: ICD-10-CM

## 2023-03-27 PROCEDURE — 99204 OFFICE O/P NEW MOD 45 MIN: CPT | Performed by: SURGERY

## 2023-03-27 NOTE — H&P (VIEW-ONLY)
HPI: Tan Florence is a 37 year old male referred to see me by No Ref-Primary, Physician for evaluation of gastroesophageal reflux disease.  He notes  a several year history of gastroesophageal reflux disease.  He states he has had esophageal burning and intermittent nighttime cough over the past several years.  His main complaint was sinus congestion and thickened phlegm for which she is seeing Dr. Dixon of ENT.  He denies any water brash but does have dietary related esophageal burning.  He he is able to lay flat at night but sometimes notes that if he has a large meal he will have to prop his head up with pillows secondary to the sinus congestion he experiences.  Denies any dysphagia.  His GERD HR Q OL is 32.  He has been started on Protonix and states this is helped with some of the esophageal burning.    Allergies:Patient has no known allergies.    Past Medical History:   Diagnosis Date     NO ACTIVE PROBLEMS 6/16/2008       Past Surgical History:   Procedure Laterality Date     NO HISTORY OF SURGERY         CURRENT MEDS:    Current Outpatient Medications:      azelastine (ASTELIN) 0.1 % nasal spray, Spray 1 spray into both nostrils 2 times daily, Disp: 30 mL, Rfl: 0     pantoprazole (PROTONIX) 40 MG EC tablet, Take 1 tablet (40 mg) by mouth daily for 90 days, Disp: 90 tablet, Rfl: 0    Family History   Problem Relation Age of Onset     Asthma No family hx of      C.A.D. No family hx of      Diabetes No family hx of      Hypertension No family hx of      Cancer - colorectal No family hx of      Prostate Cancer No family hx of         reports that he has never smoked. He has never used smokeless tobacco. He reports current alcohol use of about 5.0 standard drinks per week. He reports that he does not use drugs.    Review of Systems:  The 12 system review is within normal limits except for as mentioned above.  General ROS: No complaints or constitutional symptoms  Ophthalmic ROS: No complaints of visual  Rx Refill Note  Requested Prescriptions     Pending Prescriptions Disp Refills   • promethazine (PHENERGAN) 25 MG tablet [Pharmacy Med Name: PROMETHAZINE 25MG TABLETS] 20 tablet 0     Sig: TAKE 1/2 TABLET BY MOUTH EVERY 8 HOURS AS NEEDED FOR NAUSEA OR VOMITING      Last office visit with prescribing clinician: 10/17/2022   Last telemedicine visit with prescribing clinician: 4/19/2023   Next office visit with prescribing clinician: 4/19/2023      changes  Skin: No complaints or symptoms   Endocrine: No complaints or symptoms  Hematologic/Lymphatic: No symptoms or complaints  Psychiatric: No symptoms or complaints  Respiratory ROS: no cough, shortness of breath, or wheezing  Cardiovascular ROS: no chest pain or dyspnea on exertion  Gastrointestinal ROS: As per HPI  Genito-Urinary ROS: no dysuria, trouble voiding, or hematuria  Musculoskeletal ROS: no joint or muscle pain  Neurological ROS: no TIA or stroke symptoms      EXAM:  Wt 103.2 kg (227 lb 8 oz)   BMI 31.73 kg/m    GENERAL: Well developed male, No acute distress, pleasant and conversant   EYES: Pupils equal, round and reactive, no scleral icterus  ABDOMEN: Soft, non-tender, no masses, non-distended  SKIN: Pink, warm and dry, no obvious rashes or lesions   NEURO:No focal deficits, ambulatory  MUSCULOSKELETAL:No obvious deformities, no swelling, normal appearing      LABS:  No results found for: WBC, HGB, HCT, MCV, PLT  INR/Prothrombin Time  @LABRCNTIP(NA,K,CL,co2,bun,creatinine,labglom,glucose,calcium)@  No results found for: ALT, AST, GGT, ALKPHOS, BILITOT    IMAGES:   Relevant images were reviewed and discussed with the patient.  Notable findings were: Esophagram images reviewed and demonstrates gastroesophageal and esophageal reflux    Assessment/Plan:   Tan Florence is a 37 year old male with signs and symptoms consistent with gastroesophageal reflux disease.  I have explained the pathophysiology of GERD in detail as well as the surgical versus non-operative management strategies.      At this point we will proceed with continued initial work-up.  This will include upper endoscopy with biopsies.  Once this is complete we will have plans for the neck step in his management for his reflux.    Sumit David DO FACS  [phone]  [facility] Department of Surgery

## 2023-03-27 NOTE — TELEPHONE ENCOUNTER
Called pt and left a VM to call back about results.  Pt did have a follow up with Dr. Fuentes.  Tried 3x to call.      [facility]      Filomena Irizarry RN  [facility]  ENT  [facility] [address]  [email or web address]   Office:[phone]  Employed by [facility]

## 2023-03-27 NOTE — PROGRESS NOTES
HPI: Tan Florence is a 37 year old male referred to see me by No Ref-Primary, Physician for evaluation of gastroesophageal reflux disease.  He notes  a several year history of gastroesophageal reflux disease.  He states he has had esophageal burning and intermittent nighttime cough over the past several years.  His main complaint was sinus congestion and thickened phlegm for which she is seeing Dr. Dixon of ENT.  He denies any water brash but does have dietary related esophageal burning.  He he is able to lay flat at night but sometimes notes that if he has a large meal he will have to prop his head up with pillows secondary to the sinus congestion he experiences.  Denies any dysphagia.  His GERD HR Q OL is 32.  He has been started on Protonix and states this is helped with some of the esophageal burning.    Allergies:Patient has no known allergies.    Past Medical History:   Diagnosis Date     NO ACTIVE PROBLEMS 6/16/2008       Past Surgical History:   Procedure Laterality Date     NO HISTORY OF SURGERY         CURRENT MEDS:    Current Outpatient Medications:      azelastine (ASTELIN) 0.1 % nasal spray, Spray 1 spray into both nostrils 2 times daily, Disp: 30 mL, Rfl: 0     pantoprazole (PROTONIX) 40 MG EC tablet, Take 1 tablet (40 mg) by mouth daily for 90 days, Disp: 90 tablet, Rfl: 0    Family History   Problem Relation Age of Onset     Asthma No family hx of      C.A.D. No family hx of      Diabetes No family hx of      Hypertension No family hx of      Cancer - colorectal No family hx of      Prostate Cancer No family hx of         reports that he has never smoked. He has never used smokeless tobacco. He reports current alcohol use of about 5.0 standard drinks per week. He reports that he does not use drugs.    Review of Systems:  The 12 system review is within normal limits except for as mentioned above.  General ROS: No complaints or constitutional symptoms  Ophthalmic ROS: No complaints of visual  changes  Skin: No complaints or symptoms   Endocrine: No complaints or symptoms  Hematologic/Lymphatic: No symptoms or complaints  Psychiatric: No symptoms or complaints  Respiratory ROS: no cough, shortness of breath, or wheezing  Cardiovascular ROS: no chest pain or dyspnea on exertion  Gastrointestinal ROS: As per HPI  Genito-Urinary ROS: no dysuria, trouble voiding, or hematuria  Musculoskeletal ROS: no joint or muscle pain  Neurological ROS: no TIA or stroke symptoms      EXAM:  Wt 103.2 kg (227 lb 8 oz)   BMI 31.73 kg/m    GENERAL: Well developed male, No acute distress, pleasant and conversant   EYES: Pupils equal, round and reactive, no scleral icterus  ABDOMEN: Soft, non-tender, no masses, non-distended  SKIN: Pink, warm and dry, no obvious rashes or lesions   NEURO:No focal deficits, ambulatory  MUSCULOSKELETAL:No obvious deformities, no swelling, normal appearing      LABS:  No results found for: WBC, HGB, HCT, MCV, PLT  INR/Prothrombin Time  @LABRCNTIP(NA,K,CL,co2,bun,creatinine,labglom,glucose,calcium)@  No results found for: ALT, AST, GGT, ALKPHOS, BILITOT    IMAGES:   Relevant images were reviewed and discussed with the patient.  Notable findings were: Esophagram images reviewed and demonstrates gastroesophageal and esophageal reflux    Assessment/Plan:   Tan Florence is a 37 year old male with signs and symptoms consistent with gastroesophageal reflux disease.  I have explained the pathophysiology of GERD in detail as well as the surgical versus non-operative management strategies.      At this point we will proceed with continued initial work-up.  This will include upper endoscopy with biopsies.  Once this is complete we will have plans for the neck step in his management for his reflux.    Sumit David DO FACS  [phone]  [facility] Department of Surgery

## 2023-03-27 NOTE — LETTER
3/27/2023         RE: Tan Florence  [address] [facility] [address]        Dear Colleague,    Thank you for referring your patient, Tan Florence, to the [facility] SUR[facility] AND BARIATRICS CARE [place]. Please see a copy of my visit note below.    HPI: Tan Florence is a 37 year old male referred to see me by No Ref-Primary, Physician for evaluation of gastroesophageal reflux disease.  He notes  a several year history of gastroesophageal reflux disease.  He states he has had esophageal burning and intermittent nighttime cough over the past several years.  His main complaint was sinus congestion and thickened phlegm for which she is seeing Dr. Dixon of ENT.  He denies any water brash but does have dietary related esophageal burning.  He he is able to lay flat at night but sometimes notes that if he has a large meal he will have to prop his head up with pillows secondary to the sinus congestion he experiences.  Denies any dysphagia.  His GERD HR Q OL is 32.  He has been started on Protonix and states this is helped with some of the esophageal burning.    Allergies:Patient has no known allergies.    Past Medical History:   Diagnosis Date     NO ACTIVE PROBLEMS 6/16/2008       Past Surgical History:   Procedure Laterality Date     NO HISTORY OF SURGERY         CURRENT MEDS:    Current Outpatient Medications:      azelastine (ASTELIN) 0.1 % nasal spray, Spray 1 spray into both nostrils 2 times daily, Disp: 30 mL, Rfl: 0     pantoprazole (PROTONIX) 40 MG EC tablet, Take 1 tablet (40 mg) by mouth daily for 90 days, Disp: 90 tablet, Rfl: 0    Family History   Problem Relation Age of Onset     Asthma No family hx of      C.A.D. No family hx of      Diabetes No family hx of      Hypertension No family hx of      Cancer - colorectal No family hx of      Prostate Cancer No family hx of         reports that he has never smoked. He has never used smokeless tobacco. He reports current  alcohol use of about 5.0 standard drinks per week. He reports that he does not use drugs.    Review of Systems:  The 12 system review is within normal limits except for as mentioned above.  General ROS: No complaints or constitutional symptoms  Ophthalmic ROS: No complaints of visual changes  Skin: No complaints or symptoms   Endocrine: No complaints or symptoms  Hematologic/Lymphatic: No symptoms or complaints  Psychiatric: No symptoms or complaints  Respiratory ROS: no cough, shortness of breath, or wheezing  Cardiovascular ROS: no chest pain or dyspnea on exertion  Gastrointestinal ROS: As per HPI  Genito-Urinary ROS: no dysuria, trouble voiding, or hematuria  Musculoskeletal ROS: no joint or muscle pain  Neurological ROS: no TIA or stroke symptoms      EXAM:  Wt 103.2 kg (227 lb 8 oz)   BMI 31.73 kg/m    GENERAL: Well developed male, No acute distress, pleasant and conversant   EYES: Pupils equal, round and reactive, no scleral icterus  ABDOMEN: Soft, non-tender, no masses, non-distended  SKIN: Pink, warm and dry, no obvious rashes or lesions   NEURO:No focal deficits, ambulatory  MUSCULOSKELETAL:No obvious deformities, no swelling, normal appearing      LABS:  No results found for: WBC, HGB, HCT, MCV, PLT  INR/Prothrombin Time  @LABRCNTIP(NA,K,CL,co2,bun,creatinine,labglom,glucose,calcium)@  No results found for: ALT, AST, GGT, ALKPHOS, BILITOT    IMAGES:   Relevant images were reviewed and discussed with the patient.  Notable findings were: Esophagram images reviewed and demonstrates gastroesophageal and esophageal reflux    Assessment/Plan:   Tan Florence is a 37 year old male with signs and symptoms consistent with gastroesophageal reflux disease.  I have explained the pathophysiology of GERD in detail as well as the surgical versus non-operative management strategies.      At this point we will proceed with continued initial work-up.  This will include upper endoscopy with biopsies.  Once this is  complete we will have plans for the neck step in his management for his reflux.    Sumit David DO [facility]  [phone]  [facility] Department of Surgery      Again, thank you for allowing me to participate in the care of your patient.        Sincerely,        Sumit David DO

## 2023-04-10 ENCOUNTER — ANESTHESIA EVENT (OUTPATIENT)
Dept: SURGERY | Facility: AMBULATORY SURGERY CENTER | Age: 38
End: 2023-04-10
Payer: COMMERCIAL

## 2023-04-11 ENCOUNTER — ANESTHESIA (OUTPATIENT)
Dept: SURGERY | Facility: AMBULATORY SURGERY CENTER | Age: 38
End: 2023-04-11
Payer: COMMERCIAL

## 2023-04-11 ENCOUNTER — SURGERY (OUTPATIENT)
Age: 38
End: 2023-04-11
Payer: COMMERCIAL

## 2023-04-11 ENCOUNTER — HOSPITAL ENCOUNTER (OUTPATIENT)
Facility: AMBULATORY SURGERY CENTER | Age: 38
Discharge: HOME | End: 2023-04-11
Attending: SURGERY
Payer: COMMERCIAL

## 2023-04-11 VITALS
HEIGHT: 71 IN | RESPIRATION RATE: 15 BRPM | SYSTOLIC BLOOD PRESSURE: 121 MMHG | HEART RATE: 67 BPM | TEMPERATURE: 97.3 F | OXYGEN SATURATION: 98 % | BODY MASS INDEX: 31.22 KG/M2 | WEIGHT: 223 LBS | DIASTOLIC BLOOD PRESSURE: 82 MMHG

## 2023-04-11 DIAGNOSIS — K21.9 GASTROESOPHAGEAL REFLUX DISEASE, UNSPECIFIED WHETHER ESOPHAGITIS PRESENT: ICD-10-CM

## 2023-04-11 LAB — UPPER GI ENDOSCOPY: NORMAL

## 2023-04-11 PROCEDURE — 43239 EGD BIOPSY SINGLE/MULTIPLE: CPT | Performed by: SURGERY

## 2023-04-11 RX ORDER — OXYCODONE HYDROCHLORIDE 5 MG/1
5 TABLET ORAL
Status: DISCONTINUED | OUTPATIENT
Start: 2023-04-11 | End: 2023-04-12 | Stop reason: HOSPADM

## 2023-04-11 RX ORDER — OXYCODONE HYDROCHLORIDE 10 MG/1
10 TABLET ORAL
Status: DISCONTINUED | OUTPATIENT
Start: 2023-04-11 | End: 2023-04-12 | Stop reason: HOSPADM

## 2023-04-11 RX ORDER — SODIUM CHLORIDE, SODIUM LACTATE, POTASSIUM CHLORIDE, CALCIUM CHLORIDE 600; 310; 30; 20 MG/100ML; MG/100ML; MG/100ML; MG/100ML
INJECTION, SOLUTION INTRAVENOUS CONTINUOUS
Status: DISCONTINUED | OUTPATIENT
Start: 2023-04-11 | End: 2023-04-12 | Stop reason: HOSPADM

## 2023-04-11 RX ORDER — LIDOCAINE 40 MG/G
CREAM TOPICAL
Status: DISCONTINUED | OUTPATIENT
Start: 2023-04-11 | End: 2023-04-12 | Stop reason: HOSPADM

## 2023-04-11 RX ORDER — PROPOFOL 10 MG/ML
INJECTION, EMULSION INTRAVENOUS CONTINUOUS PRN
Status: DISCONTINUED | OUTPATIENT
Start: 2023-04-11 | End: 2023-04-11

## 2023-04-11 RX ORDER — SODIUM CHLORIDE, SODIUM LACTATE, POTASSIUM CHLORIDE, CALCIUM CHLORIDE 600; 310; 30; 20 MG/100ML; MG/100ML; MG/100ML; MG/100ML
INJECTION, SOLUTION INTRAVENOUS CONTINUOUS PRN
Status: DISCONTINUED | OUTPATIENT
Start: 2023-04-11 | End: 2023-04-11

## 2023-04-11 RX ORDER — LIDOCAINE HYDROCHLORIDE 20 MG/ML
INJECTION, SOLUTION INFILTRATION; PERINEURAL PRN
Status: DISCONTINUED | OUTPATIENT
Start: 2023-04-11 | End: 2023-04-11

## 2023-04-11 RX ADMIN — PROPOFOL 250 MCG/KG/MIN: 10 INJECTION, EMULSION INTRAVENOUS at 12:57

## 2023-04-11 RX ADMIN — LIDOCAINE HYDROCHLORIDE 100 MG: 20 INJECTION, SOLUTION INFILTRATION; PERINEURAL at 12:57

## 2023-04-11 RX ADMIN — SODIUM CHLORIDE, SODIUM LACTATE, POTASSIUM CHLORIDE, CALCIUM CHLORIDE: 600; 310; 30; 20 INJECTION, SOLUTION INTRAVENOUS at 11:44

## 2023-04-11 RX ADMIN — SODIUM CHLORIDE, SODIUM LACTATE, POTASSIUM CHLORIDE, CALCIUM CHLORIDE: 600; 310; 30; 20 INJECTION, SOLUTION INTRAVENOUS at 12:40

## 2023-04-11 NOTE — ANESTHESIA POSTPROCEDURE EVALUATION
Patient: Tan Florence    Procedure: Procedure(s):  ESOPHAGOGASTRODUODENOSCOPY, WITH BIOPSY       Anesthesia Type:  MAC    Note:  Disposition: Outpatient   Postop Pain Control: Uneventful            Sign Out: Well controlled pain   PONV: No   Neuro/Psych: Uneventful            Sign Out: Acceptable/Baseline neuro status   Airway/Respiratory: Uneventful            Sign Out: Acceptable/Baseline resp. status   CV/Hemodynamics: Uneventful            Sign Out: Acceptable CV status; No obvious hypovolemia; No obvious fluid overload   Other NRE:    DID A NON-ROUTINE EVENT OCCUR?            Last vitals:  Vitals Value Taken Time   /74 04/11/23 1315   Temp 97.3  F (36.3  C) 04/11/23 1311   Pulse 79 04/11/23 1326   Resp 16 04/11/23 1311   SpO2 98 % 04/11/23 1326   Vitals shown include unvalidated device data.    Electronically Signed By: Uzair Shetty MD  April 11, 2023  1:29 PM

## 2023-04-11 NOTE — ANESTHESIA CARE TRANSFER NOTE
Patient: Tan Florence    Procedure: Procedure(s):  ESOPHAGOGASTRODUODENOSCOPY, WITH BIOPSY       Diagnosis: Gastroesophageal reflux disease, unspecified whether esophagitis present [K21.9]  Diagnosis Additional Information: No value filed.    Anesthesia Type:   MAC     Note:    Oropharynx: oropharynx clear of all foreign objects  Level of Consciousness: awake  Oxygen Supplementation: room air    Independent Airway: airway patency satisfactory and stable  Dentition: dentition unchanged  Vital Signs Stable: post-procedure vital signs reviewed and stable  Report to RN Given: handoff report given  Patient transferred to: Phase II    Handoff Report: Identifed the Patient, Identified the Reponsible Provider, Reviewed the pertinent medical history, Discussed the surgical course, Reviewed Intra-OP anesthesia mangement and issues during anesthesia, Set expectations for post-procedure period and Allowed opportunity for questions and acknowledgement of understanding      Vitals:  Vitals Value Taken Time   /73 04/11/23 1311   Temp 97.3  F (36.3  C) 04/11/23 1311   Pulse 85 04/11/23 1311   Resp 16 04/11/23 1311   SpO2 98 % 04/11/23 1311       Electronically Signed By: LD Mcdowell CRNA  April 11, 2023  1:13 PM

## 2023-04-11 NOTE — ANESTHESIA PREPROCEDURE EVALUATION
Anesthesia Pre-Procedure Evaluation    Patient: Tan Florence   MRN: [id number] : 1985        Procedure : Procedure(s):  ESOPHAGOGASTRODUODENOSCOPY, WITH BIOPSY          Past Medical History:   Diagnosis Date     Gastroesophageal reflux disease      NO ACTIVE PROBLEMS 2008     Obese       Past Surgical History:   Procedure Laterality Date     NO HISTORY OF SURGERY        No Known Allergies   Social History     Tobacco Use     Smoking status: Never     Smokeless tobacco: Never   Vaping Use     Vaping status: Not on file   Substance Use Topics     Alcohol use: Yes     Alcohol/week: 5.0 standard drinks of alcohol     Types: 6 drink(s) per week     Comment: couple times per week      Wt Readings from Last 1 Encounters:   23 101.2 kg (223 lb)        Anesthesia Evaluation            ROS/MED HX  ENT/Pulmonary:     (+) sleep apnea,     Neurologic:  - neg neurologic ROS     Cardiovascular:  - neg cardiovascular ROS     METS/Exercise Tolerance: >4 METS    Hematologic:  - neg hematologic  ROS     Musculoskeletal:  - neg musculoskeletal ROS     GI/Hepatic:     (+) GERD, Asymptomatic on medication,     Renal/Genitourinary:  - neg Renal ROS     Endo:     (+) Obesity,     Psychiatric/Substance Use:     (+) alcohol abuse     Infectious Disease:  - neg infectious disease ROS     Malignancy:  - neg malignancy ROS     Other:  - neg other ROS          Physical Exam    Airway  airway exam normal      Mallampati: II       Respiratory Devices and Support         Dental           Cardiovascular   cardiovascular exam normal       Rhythm and rate: regular and normal     Pulmonary   pulmonary exam normal        breath sounds clear to auscultation           OUTSIDE LABS:  CBC: No results found for: WBC, HGB, HCT, PLT  BMP: No results found for: NA, POTASSIUM, CHLORIDE, CO2, BUN, CR, GLC  COAGS: No results found for: PTT, INR, FIBR  POC: No results found for: BGM, HCG, HCGS  HEPATIC: No results found for: ALBUMIN,  PROTTOTAL, ALT, AST, GGT, ALKPHOS, BILITOTAL, BILIDIRECT, ALISIA  OTHER: No results found for: PH, LACT, A1C, BENJIE, PHOS, MAG, LIPASE, AMYLASE, TSH, T4, T3, CRP, SED    Anesthesia Plan    ASA Status:  2      Anesthesia Type: MAC.   Induction: Intravenous, Propofol.   Maintenance: TIVA.        Consents    Anesthesia Plan(s) and associated risks, benefits, and realistic alternatives discussed. Questions answered and patient/representative(s) expressed understanding.    - Discussed:     - Discussed with:  Patient      - Extended Intubation/Ventilatory Support Discussed: No.      - Patient is DNR/DNI Status: No    Use of blood products discussed: No .     Postoperative Care    Pain management: IV analgesics.   PONV prophylaxis: Ondansetron (or other 5HT-3), Dexamethasone or Solumedrol     Comments:    Other Comments: The patient understands and accepts the risks of MAC anesthesia including (but not limited to) nausea, vomiting, dizziness, and chipped teeth. I also discussed the possibility of conversion to GAETT/GALMA anesthesia which include hoarse voice, sore throat, and pinched lip or chipped teeth.  Versed/fent  propofol ggt  Decadron/zofran            Uzair Shetty MD

## 2023-04-11 NOTE — INTERVAL H&P NOTE
"I have reviewed the surgical (or preoperative) H&P that is linked to this encounter, and examined the patient. There are no significant changes    Clinical Conditions Present on Arrival:  Clinically Significant Risk Factors Present on Admission                    # Obesity: Estimated body mass index is 31.1 kg/m  as calculated from the following:    Height as of this encounter: 1.803 m (5' 11\").    Weight as of this encounter: 101.2 kg (223 lb).     Cardiac-regular rate and rhythm    Lungs- clear to auscultation bilaterally    Plan for EGD with biopsies    Rashid David [facility]  [phone]      "

## 2023-04-18 ENCOUNTER — TELEPHONE (OUTPATIENT)
Dept: SURGERY | Facility: CLINIC | Age: 38
End: 2023-04-18
Payer: COMMERCIAL

## 2023-04-18 NOTE — TELEPHONE ENCOUNTER
Tan had a tele visit with Dr. David on 04/17/2023.     Number was not in service. Dr. David tried multiple times before ending his day.    I called pt this morning to let him know that his appt will be cancelled and he will need to call our office to reschedule.    Jermaine Michele CMA  [facility]  Surgery [facility]  [facility] [address] [address]  Office: [phone]  Fax: [phone]

## 2023-04-22 ENCOUNTER — HEALTH MAINTENANCE LETTER (OUTPATIENT)
Age: 38
End: 2023-04-22

## 2023-05-02 DIAGNOSIS — K21.9 GASTROESOPHAGEAL REFLUX DISEASE, UNSPECIFIED WHETHER ESOPHAGITIS PRESENT: Primary | ICD-10-CM

## 2023-05-02 RX ORDER — PANTOPRAZOLE SODIUM 40 MG/1
40 TABLET, DELAYED RELEASE ORAL DAILY
Qty: 30 TABLET | Refills: 0 | Status: SHIPPED | OUTPATIENT
Start: 2023-05-02 | End: 2023-06-01

## 2023-06-30 ENCOUNTER — OFFICE VISIT (OUTPATIENT)
Dept: FAMILY MEDICINE | Facility: CLINIC | Age: 38
End: 2023-06-30
Payer: COMMERCIAL

## 2023-06-30 VITALS
DIASTOLIC BLOOD PRESSURE: 68 MMHG | RESPIRATION RATE: 20 BRPM | WEIGHT: 217 LBS | BODY MASS INDEX: 30.38 KG/M2 | OXYGEN SATURATION: 95 % | HEART RATE: 68 BPM | HEIGHT: 71 IN | SYSTOLIC BLOOD PRESSURE: 113 MMHG | TEMPERATURE: 98.5 F

## 2023-06-30 DIAGNOSIS — B07.8 PERIUNGUAL WART: Primary | ICD-10-CM

## 2023-06-30 DIAGNOSIS — J32.9 CHRONIC CONGESTION OF PARANASAL SINUS: ICD-10-CM

## 2023-06-30 PROCEDURE — 99213 OFFICE O/P EST LOW 20 MIN: CPT | Performed by: FAMILY MEDICINE

## 2023-06-30 RX ORDER — AZELASTINE 1 MG/ML
1 SPRAY, METERED NASAL 2 TIMES DAILY
Qty: 30 ML | Refills: 0 | Status: SHIPPED | OUTPATIENT
Start: 2023-06-30 | End: 2023-12-07

## 2023-06-30 NOTE — PROGRESS NOTES
"  Assessment & Plan     ICD-10-CM    1. Periungual wart  B07.8       2. Chronic congestion of paranasal sinus  J32.9 azelastine (ASTELIN) 0.1 % nasal spray        Wart treated with freeze-and-thaw with liquid nitrogen.  Cares discussed.  Printed education materials provided.  Medication refill for Dianne provided       BMI:   Estimated body mass index is 30.27 kg/m  as calculated from the following:    Height as of this encounter: 1.803 m (5' 11\").    Weight as of this encounter: 98.4 kg (217 lb).       MEDICATIONS:  Continue current medications without change  See Patient Instructions    Yesica Castanon MD  [facility]    Paola Maddox is a 37 year old, presenting for the following health issues:  Wart (Wart on right pointer finger x months, has tried OTC wart treatments)        6/30/2023     2:07 PM   Additional Questions   Roomed by Edna GARCIA LPN     History of Present Illness       Reason for visit:  Wart on finger  Symptom onset:  More than a month    He eats 2-3 servings of fruits and vegetables daily.He consumes 0 sweetened beverage(s) daily.He exercises with enough effort to increase his heart rate 20 to 29 minutes per day.  He exercises with enough effort to increase his heart rate 4 days per week.   He is taking medications regularly.     Patient Active Problem List   Diagnosis     NO ACTIVE PROBLEMS     Overweight (BMI 25.0-29.9)     Obstructive sleep apnea syndrome     Multiple atypical nevi     Hyperlipidemia     Habitual alcohol use     Colitis     Chronic rhinitis     Gastroesophageal reflux disease, unspecified whether esophagitis present     Current Outpatient Medications   Medication     azelastine (ASTELIN) 0.1 % nasal spray     No current facility-administered medications for this visit.           Review of Systems   Constitutional, HEENT, cardiovascular, pulmonary, gi and gu systems are negative, except as otherwise noted.      Objective    /68   Pulse 68  " " Temp 98.5  F (36.9  C) (Oral)   Resp 20   Ht 1.803 m (5' 11\")   Wt 98.4 kg (217 lb)   SpO2 95%   BMI 30.27 kg/m    Body mass index is 30.27 kg/m .  Physical Exam   GENERAL: healthy, alert and no distress  Skin: Single periungual wart noted on the medial side of index finger.  Procedure: Using liquid nitrogen wart was treated.  Patient tolerated the procedure well            "

## 2023-09-23 ENCOUNTER — HEALTH MAINTENANCE LETTER (OUTPATIENT)
Age: 38
End: 2023-09-23

## 2023-11-07 ENCOUNTER — HOSPITAL ENCOUNTER (INPATIENT)
Facility: HOSPITAL | Age: 38
LOS: 1 days | Discharge: HOME | DRG: 153 | End: 2023-11-09
Attending: EMERGENCY MEDICINE | Admitting: FAMILY MEDICINE
Payer: COMMERCIAL

## 2023-11-07 DIAGNOSIS — J36 PERITONSILLAR ABSCESS: ICD-10-CM

## 2023-11-07 PROCEDURE — 99285 EMERGENCY DEPT VISIT HI MDM: CPT | Mod: 25

## 2023-11-08 ENCOUNTER — APPOINTMENT (OUTPATIENT)
Dept: CT IMAGING | Facility: HOSPITAL | Age: 38
DRG: 153 | End: 2023-11-08
Attending: EMERGENCY MEDICINE
Payer: COMMERCIAL

## 2023-11-08 ENCOUNTER — ANESTHESIA (OUTPATIENT)
Dept: SURGERY | Facility: HOSPITAL | Age: 38
DRG: 153 | End: 2023-11-08
Payer: COMMERCIAL

## 2023-11-08 ENCOUNTER — ANESTHESIA EVENT (OUTPATIENT)
Dept: SURGERY | Facility: HOSPITAL | Age: 38
DRG: 153 | End: 2023-11-08
Payer: COMMERCIAL

## 2023-11-08 PROBLEM — J36 PERITONSILLAR ABSCESS: Status: ACTIVE | Noted: 2023-11-08

## 2023-11-08 LAB
ACANTHOCYTES BLD QL SMEAR: ABNORMAL
ANION GAP SERPL CALCULATED.3IONS-SCNC: 10 MMOL/L (ref 7–15)
AUER BODIES BLD QL SMEAR: ABNORMAL
BASO STIPL BLD QL SMEAR: ABNORMAL
BASOPHILS # BLD AUTO: 0.1 10E3/UL (ref 0–0.2)
BASOPHILS NFR BLD AUTO: 0 %
BITE CELLS BLD QL SMEAR: ABNORMAL
BLISTER CELLS BLD QL SMEAR: ABNORMAL
BUN SERPL-MCNC: 11.8 MG/DL (ref 6–20)
BURR CELLS BLD QL SMEAR: ABNORMAL
CALCIUM SERPL-MCNC: 9.6 MG/DL (ref 8.6–10)
CHLORIDE SERPL-SCNC: 102 MMOL/L (ref 98–107)
CREAT SERPL-MCNC: 0.81 MG/DL (ref 0.67–1.17)
CRP SERPL-MCNC: 54.1 MG/L
DACRYOCYTES BLD QL SMEAR: ABNORMAL
DEPRECATED HCO3 PLAS-SCNC: 25 MMOL/L (ref 22–29)
EGFRCR SERPLBLD CKD-EPI 2021: >90 ML/MIN/1.73M2
ELLIPTOCYTES BLD QL SMEAR: ABNORMAL
EOSINOPHIL # BLD AUTO: 0.1 10E3/UL (ref 0–0.7)
EOSINOPHIL NFR BLD AUTO: 1 %
ERYTHROCYTE [DISTWIDTH] IN BLOOD BY AUTOMATED COUNT: 12.5 % (ref 10–15)
FRAGMENTS BLD QL SMEAR: ABNORMAL
GLUCOSE SERPL-MCNC: 101 MG/DL (ref 70–99)
GRAM STAIN RESULT: ABNORMAL
GRAM STAIN RESULT: ABNORMAL
HCT VFR BLD AUTO: 40 % (ref 40–53)
HGB BLD-MCNC: 13.6 G/DL (ref 13.3–17.7)
HGB C CRYSTALS: ABNORMAL
HOWELL-JOLLY BOD BLD QL SMEAR: ABNORMAL
IMM GRANULOCYTES # BLD: 0.2 10E3/UL
IMM GRANULOCYTES NFR BLD: 1 %
LYMPHOCYTES # BLD AUTO: 2.6 10E3/UL (ref 0.8–5.3)
LYMPHOCYTES NFR BLD AUTO: 19 %
MCH RBC QN AUTO: 28.8 PG (ref 26.5–33)
MCHC RBC AUTO-ENTMCNC: 34 G/DL (ref 31.5–36.5)
MCV RBC AUTO: 85 FL (ref 78–100)
MONOCYTES # BLD AUTO: 1.1 10E3/UL (ref 0–1.3)
MONOCYTES NFR BLD AUTO: 8 %
NEUTROPHILS # BLD AUTO: 10.1 10E3/UL (ref 1.6–8.3)
NEUTROPHILS NFR BLD AUTO: 71 %
NEUTS HYPERSEG BLD QL SMEAR: ABNORMAL
NRBC # BLD AUTO: 0 10E3/UL
NRBC BLD AUTO-RTO: 0 /100
PLAT MORPH BLD: ABNORMAL
PLATELET # BLD AUTO: 313 10E3/UL (ref 150–450)
POLYCHROMASIA BLD QL SMEAR: ABNORMAL
POTASSIUM SERPL-SCNC: 3.8 MMOL/L (ref 3.4–5.3)
RBC # BLD AUTO: 4.73 10E6/UL (ref 4.4–5.9)
RBC AGGLUT BLD QL: ABNORMAL
RBC MORPH BLD: ABNORMAL
ROULEAUX BLD QL SMEAR: ABNORMAL
SICKLE CELLS BLD QL SMEAR: ABNORMAL
SMUDGE CELLS BLD QL SMEAR: ABNORMAL
SODIUM SERPL-SCNC: 137 MMOL/L (ref 135–145)
SPHEROCYTES BLD QL SMEAR: ABNORMAL
STOMATOCYTES BLD QL SMEAR: ABNORMAL
TARGETS BLD QL SMEAR: ABNORMAL
TOXIC GRANULES BLD QL SMEAR: ABNORMAL
VARIANT LYMPHS BLD QL SMEAR: PRESENT
WBC # BLD AUTO: 14.1 10E3/UL (ref 4–11)

## 2023-11-08 PROCEDURE — 258N000003 HC RX IP 258 OP 636: Performed by: EMERGENCY MEDICINE

## 2023-11-08 PROCEDURE — 96374 THER/PROPH/DIAG INJ IV PUSH: CPT | Mod: 59

## 2023-11-08 PROCEDURE — 250N000011 HC RX IP 250 OP 636: Performed by: ANESTHESIOLOGY

## 2023-11-08 PROCEDURE — 70491 CT SOFT TISSUE NECK W/DYE: CPT

## 2023-11-08 PROCEDURE — 96365 THER/PROPH/DIAG IV INF INIT: CPT

## 2023-11-08 PROCEDURE — 96376 TX/PRO/DX INJ SAME DRUG ADON: CPT

## 2023-11-08 PROCEDURE — 250N000009 HC RX 250: Performed by: OTOLARYNGOLOGY

## 2023-11-08 PROCEDURE — 250N000011 HC RX IP 250 OP 636: Mod: JZ | Performed by: EMERGENCY MEDICINE

## 2023-11-08 PROCEDURE — 250N000009 HC RX 250: Performed by: ANESTHESIOLOGY

## 2023-11-08 PROCEDURE — 120N000001 HC R&B MED SURG/OB

## 2023-11-08 PROCEDURE — 370N000017 HC ANESTHESIA TECHNICAL FEE, PER MIN: Performed by: OTOLARYNGOLOGY

## 2023-11-08 PROCEDURE — 99207 PR NO CHARGE LOS: CPT | Performed by: STUDENT IN AN ORGANIZED HEALTH CARE EDUCATION/TRAINING PROGRAM

## 2023-11-08 PROCEDURE — 250N000011 HC RX IP 250 OP 636: Mod: JZ | Performed by: OTOLARYNGOLOGY

## 2023-11-08 PROCEDURE — 96375 TX/PRO/DX INJ NEW DRUG ADDON: CPT

## 2023-11-08 PROCEDURE — 360N000075 HC SURGERY LEVEL 2, PER MIN: Performed by: OTOLARYNGOLOGY

## 2023-11-08 PROCEDURE — 42700 I&D ABSCESS PERITONSILLAR: CPT | Performed by: OTOLARYNGOLOGY

## 2023-11-08 PROCEDURE — 258N000003 HC RX IP 258 OP 636: Performed by: OTOLARYNGOLOGY

## 2023-11-08 PROCEDURE — 36415 COLL VENOUS BLD VENIPUNCTURE: CPT | Performed by: EMERGENCY MEDICINE

## 2023-11-08 PROCEDURE — 87185 SC STD ENZYME DETCJ PER NZM: CPT | Performed by: OTOLARYNGOLOGY

## 2023-11-08 PROCEDURE — 96361 HYDRATE IV INFUSION ADD-ON: CPT

## 2023-11-08 PROCEDURE — 0C9P3ZZ DRAINAGE OF TONSILS, PERCUTANEOUS APPROACH: ICD-10-PCS | Performed by: OTOLARYNGOLOGY

## 2023-11-08 PROCEDURE — 86140 C-REACTIVE PROTEIN: CPT | Performed by: EMERGENCY MEDICINE

## 2023-11-08 PROCEDURE — 250N000025 HC SEVOFLURANE, PER MIN: Performed by: OTOLARYNGOLOGY

## 2023-11-08 PROCEDURE — 87205 SMEAR GRAM STAIN: CPT | Performed by: OTOLARYNGOLOGY

## 2023-11-08 PROCEDURE — G0378 HOSPITAL OBSERVATION PER HR: HCPCS

## 2023-11-08 PROCEDURE — 87077 CULTURE AEROBIC IDENTIFY: CPT | Performed by: OTOLARYNGOLOGY

## 2023-11-08 PROCEDURE — 250N000011 HC RX IP 250 OP 636: Mod: JZ | Performed by: ANESTHESIOLOGY

## 2023-11-08 PROCEDURE — 258N000003 HC RX IP 258 OP 636

## 2023-11-08 PROCEDURE — 258N000003 HC RX IP 258 OP 636: Performed by: ANESTHESIOLOGY

## 2023-11-08 PROCEDURE — 80048 BASIC METABOLIC PNL TOTAL CA: CPT | Performed by: EMERGENCY MEDICINE

## 2023-11-08 PROCEDURE — 999N000141 HC STATISTIC PRE-PROCEDURE NURSING ASSESSMENT: Performed by: OTOLARYNGOLOGY

## 2023-11-08 PROCEDURE — 272N000001 HC OR GENERAL SUPPLY STERILE: Performed by: OTOLARYNGOLOGY

## 2023-11-08 PROCEDURE — 85025 COMPLETE CBC W/AUTO DIFF WBC: CPT | Performed by: EMERGENCY MEDICINE

## 2023-11-08 PROCEDURE — 99222 1ST HOSP IP/OBS MODERATE 55: CPT | Mod: AI

## 2023-11-08 PROCEDURE — 250N000013 HC RX MED GY IP 250 OP 250 PS 637

## 2023-11-08 PROCEDURE — 710N000010 HC RECOVERY PHASE 1, LEVEL 2, PER MIN: Performed by: OTOLARYNGOLOGY

## 2023-11-08 PROCEDURE — 250N000011 HC RX IP 250 OP 636: Mod: JZ

## 2023-11-08 RX ORDER — LIDOCAINE HYDROCHLORIDE 10 MG/ML
INJECTION, SOLUTION INFILTRATION; PERINEURAL PRN
Status: DISCONTINUED | OUTPATIENT
Start: 2023-11-08 | End: 2023-11-08

## 2023-11-08 RX ORDER — ACETAMINOPHEN 500 MG
500-1000 TABLET ORAL EVERY 6 HOURS PRN
COMMUNITY
End: 2023-12-07

## 2023-11-08 RX ORDER — ONDANSETRON 2 MG/ML
INJECTION INTRAMUSCULAR; INTRAVENOUS PRN
Status: DISCONTINUED | OUTPATIENT
Start: 2023-11-08 | End: 2023-11-08

## 2023-11-08 RX ORDER — ONDANSETRON 2 MG/ML
4 INJECTION INTRAMUSCULAR; INTRAVENOUS EVERY 30 MIN PRN
Status: DISCONTINUED | OUTPATIENT
Start: 2023-11-08 | End: 2023-11-08 | Stop reason: HOSPADM

## 2023-11-08 RX ORDER — FENTANYL CITRATE 50 UG/ML
50 INJECTION, SOLUTION INTRAMUSCULAR; INTRAVENOUS EVERY 5 MIN PRN
Status: DISCONTINUED | OUTPATIENT
Start: 2023-11-08 | End: 2023-11-08 | Stop reason: HOSPADM

## 2023-11-08 RX ORDER — IBUPROFEN 200 MG
200 TABLET ORAL EVERY 4 HOURS PRN
COMMUNITY
End: 2023-12-07

## 2023-11-08 RX ORDER — SODIUM CHLORIDE, SODIUM LACTATE, POTASSIUM CHLORIDE, CALCIUM CHLORIDE 600; 310; 30; 20 MG/100ML; MG/100ML; MG/100ML; MG/100ML
INJECTION, SOLUTION INTRAVENOUS CONTINUOUS
Status: DISCONTINUED | OUTPATIENT
Start: 2023-11-08 | End: 2023-11-08 | Stop reason: HOSPADM

## 2023-11-08 RX ORDER — ONDANSETRON 2 MG/ML
4 INJECTION INTRAMUSCULAR; INTRAVENOUS EVERY 30 MIN PRN
Status: DISCONTINUED | OUTPATIENT
Start: 2023-11-08 | End: 2023-11-09

## 2023-11-08 RX ORDER — FENTANYL CITRATE 50 UG/ML
25 INJECTION, SOLUTION INTRAMUSCULAR; INTRAVENOUS EVERY 5 MIN PRN
Status: DISCONTINUED | OUTPATIENT
Start: 2023-11-08 | End: 2023-11-08 | Stop reason: HOSPADM

## 2023-11-08 RX ORDER — CLINDAMYCIN HCL 300 MG
300 CAPSULE ORAL 3 TIMES DAILY
Status: ON HOLD | COMMUNITY
Start: 2023-11-02 | End: 2023-11-09

## 2023-11-08 RX ORDER — DEXAMETHASONE SODIUM PHOSPHATE 10 MG/ML
INJECTION, SOLUTION INTRAMUSCULAR; INTRAVENOUS PRN
Status: DISCONTINUED | OUTPATIENT
Start: 2023-11-08 | End: 2023-11-08

## 2023-11-08 RX ORDER — ONDANSETRON 4 MG/1
4 TABLET, ORALLY DISINTEGRATING ORAL EVERY 6 HOURS PRN
Status: DISCONTINUED | OUTPATIENT
Start: 2023-11-08 | End: 2023-11-09 | Stop reason: HOSPADM

## 2023-11-08 RX ORDER — SODIUM CHLORIDE 9 MG/ML
INJECTION, SOLUTION INTRAVENOUS CONTINUOUS
Status: DISCONTINUED | OUTPATIENT
Start: 2023-11-08 | End: 2023-11-09 | Stop reason: HOSPADM

## 2023-11-08 RX ORDER — KETOROLAC TROMETHAMINE 15 MG/ML
15 INJECTION, SOLUTION INTRAMUSCULAR; INTRAVENOUS ONCE
Status: COMPLETED | OUTPATIENT
Start: 2023-11-08 | End: 2023-11-08

## 2023-11-08 RX ORDER — PIPERACILLIN SODIUM, TAZOBACTAM SODIUM 3; .375 G/15ML; G/15ML
3.38 INJECTION, POWDER, LYOPHILIZED, FOR SOLUTION INTRAVENOUS EVERY 8 HOURS
Status: DISCONTINUED | OUTPATIENT
Start: 2023-11-08 | End: 2023-11-09 | Stop reason: HOSPADM

## 2023-11-08 RX ORDER — PROPOFOL 10 MG/ML
INJECTION, EMULSION INTRAVENOUS CONTINUOUS PRN
Status: DISCONTINUED | OUTPATIENT
Start: 2023-11-08 | End: 2023-11-08

## 2023-11-08 RX ORDER — LIDOCAINE HYDROCHLORIDE AND EPINEPHRINE 10; 10 MG/ML; UG/ML
INJECTION, SOLUTION INFILTRATION; PERINEURAL PRN
Status: DISCONTINUED | OUTPATIENT
Start: 2023-11-08 | End: 2023-11-08 | Stop reason: HOSPADM

## 2023-11-08 RX ORDER — ONDANSETRON 4 MG/1
4 TABLET, ORALLY DISINTEGRATING ORAL EVERY 30 MIN PRN
Status: DISCONTINUED | OUTPATIENT
Start: 2023-11-08 | End: 2023-11-08 | Stop reason: HOSPADM

## 2023-11-08 RX ORDER — IOPAMIDOL 755 MG/ML
90 INJECTION, SOLUTION INTRAVASCULAR ONCE
Status: COMPLETED | OUTPATIENT
Start: 2023-11-08 | End: 2023-11-08

## 2023-11-08 RX ORDER — FENTANYL CITRATE 50 UG/ML
INJECTION, SOLUTION INTRAMUSCULAR; INTRAVENOUS PRN
Status: DISCONTINUED | OUTPATIENT
Start: 2023-11-08 | End: 2023-11-08

## 2023-11-08 RX ORDER — ACETAMINOPHEN 650 MG/20.3ML
650 LIQUID ORAL EVERY 4 HOURS PRN
Status: DISCONTINUED | OUTPATIENT
Start: 2023-11-08 | End: 2023-11-09 | Stop reason: HOSPADM

## 2023-11-08 RX ORDER — KETAMINE HYDROCHLORIDE 10 MG/ML
INJECTION INTRAMUSCULAR; INTRAVENOUS PRN
Status: DISCONTINUED | OUTPATIENT
Start: 2023-11-08 | End: 2023-11-08

## 2023-11-08 RX ORDER — DEXAMETHASONE SODIUM PHOSPHATE 4 MG/ML
10 INJECTION, SOLUTION INTRA-ARTICULAR; INTRALESIONAL; INTRAMUSCULAR; INTRAVENOUS; SOFT TISSUE ONCE
Status: COMPLETED | OUTPATIENT
Start: 2023-11-08 | End: 2023-11-08

## 2023-11-08 RX ORDER — LIDOCAINE 40 MG/G
CREAM TOPICAL
Status: DISCONTINUED | OUTPATIENT
Start: 2023-11-08 | End: 2023-11-08 | Stop reason: HOSPADM

## 2023-11-08 RX ORDER — PROPOFOL 10 MG/ML
INJECTION, EMULSION INTRAVENOUS PRN
Status: DISCONTINUED | OUTPATIENT
Start: 2023-11-08 | End: 2023-11-08

## 2023-11-08 RX ORDER — ONDANSETRON 2 MG/ML
4 INJECTION INTRAMUSCULAR; INTRAVENOUS EVERY 6 HOURS PRN
Status: DISCONTINUED | OUTPATIENT
Start: 2023-11-08 | End: 2023-11-09 | Stop reason: HOSPADM

## 2023-11-08 RX ORDER — PIPERACILLIN SODIUM, TAZOBACTAM SODIUM 3; .375 G/15ML; G/15ML
3.38 INJECTION, POWDER, LYOPHILIZED, FOR SOLUTION INTRAVENOUS ONCE
Status: COMPLETED | OUTPATIENT
Start: 2023-11-08 | End: 2023-11-08

## 2023-11-08 RX ORDER — MAGNESIUM HYDROXIDE 1200 MG/15ML
LIQUID ORAL PRN
Status: DISCONTINUED | OUTPATIENT
Start: 2023-11-08 | End: 2023-11-08 | Stop reason: HOSPADM

## 2023-11-08 RX ADMIN — SODIUM CHLORIDE, POTASSIUM CHLORIDE, SODIUM LACTATE AND CALCIUM CHLORIDE: 600; 310; 30; 20 INJECTION, SOLUTION INTRAVENOUS at 16:45

## 2023-11-08 RX ADMIN — SODIUM CHLORIDE: 9 INJECTION, SOLUTION INTRAVENOUS at 06:54

## 2023-11-08 RX ADMIN — ROCURONIUM BROMIDE 40 MG: 50 INJECTION, SOLUTION INTRAVENOUS at 15:23

## 2023-11-08 RX ADMIN — HYDROMORPHONE HYDROCHLORIDE 0.4 MG: 1 INJECTION, SOLUTION INTRAMUSCULAR; INTRAVENOUS; SUBCUTANEOUS at 17:01

## 2023-11-08 RX ADMIN — ONDANSETRON 4 MG: 2 INJECTION INTRAMUSCULAR; INTRAVENOUS at 15:44

## 2023-11-08 RX ADMIN — KETOROLAC TROMETHAMINE 15 MG: 15 INJECTION, SOLUTION INTRAMUSCULAR; INTRAVENOUS at 00:38

## 2023-11-08 RX ADMIN — PIPERACILLIN AND TAZOBACTAM 3.38 G: 3; .375 INJECTION, POWDER, FOR SOLUTION INTRAVENOUS at 03:20

## 2023-11-08 RX ADMIN — FENTANYL CITRATE 100 MCG: 50 INJECTION INTRAMUSCULAR; INTRAVENOUS at 15:23

## 2023-11-08 RX ADMIN — KETAMINE HYDROCHLORIDE 30 MG: 10 INJECTION INTRAMUSCULAR; INTRAVENOUS at 15:36

## 2023-11-08 RX ADMIN — FENTANYL CITRATE 50 MCG: 50 INJECTION, SOLUTION INTRAMUSCULAR; INTRAVENOUS at 16:52

## 2023-11-08 RX ADMIN — DEXAMETHASONE SODIUM PHOSPHATE 8 MG: 10 INJECTION, SOLUTION INTRAMUSCULAR; INTRAVENOUS at 15:23

## 2023-11-08 RX ADMIN — PROPOFOL 50 MCG/KG/MIN: 10 INJECTION, EMULSION INTRAVENOUS at 15:34

## 2023-11-08 RX ADMIN — PROPOFOL 170 MG: 10 INJECTION, EMULSION INTRAVENOUS at 15:23

## 2023-11-08 RX ADMIN — SODIUM CHLORIDE, POTASSIUM CHLORIDE, SODIUM LACTATE AND CALCIUM CHLORIDE: 600; 310; 30; 20 INJECTION, SOLUTION INTRAVENOUS at 13:25

## 2023-11-08 RX ADMIN — DEXAMETHASONE SODIUM PHOSPHATE 10 MG: 4 INJECTION, SOLUTION INTRA-ARTICULAR; INTRALESIONAL; INTRAMUSCULAR; INTRAVENOUS; SOFT TISSUE at 01:30

## 2023-11-08 RX ADMIN — SODIUM CHLORIDE 1000 ML: 9 INJECTION, SOLUTION INTRAVENOUS at 00:40

## 2023-11-08 RX ADMIN — FENTANYL CITRATE 50 MCG: 50 INJECTION, SOLUTION INTRAMUSCULAR; INTRAVENOUS at 16:42

## 2023-11-08 RX ADMIN — DEXMEDETOMIDINE HYDROCHLORIDE 8 MCG: 100 INJECTION, SOLUTION INTRAVENOUS at 15:44

## 2023-11-08 RX ADMIN — DEXMEDETOMIDINE HYDROCHLORIDE 8 MCG: 100 INJECTION, SOLUTION INTRAVENOUS at 15:19

## 2023-11-08 RX ADMIN — HYDROMORPHONE HYDROCHLORIDE 0.5 MG: 1 INJECTION, SOLUTION INTRAMUSCULAR; INTRAVENOUS; SUBCUTANEOUS at 09:33

## 2023-11-08 RX ADMIN — IOPAMIDOL 90 ML: 755 INJECTION, SOLUTION INTRAVENOUS at 01:17

## 2023-11-08 RX ADMIN — LIDOCAINE HYDROCHLORIDE 5 ML: 10 INJECTION, SOLUTION INFILTRATION; PERINEURAL at 15:23

## 2023-11-08 RX ADMIN — MIDAZOLAM 2 MG: 1 INJECTION INTRAMUSCULAR; INTRAVENOUS at 15:13

## 2023-11-08 RX ADMIN — DEXMEDETOMIDINE HYDROCHLORIDE 8 MCG: 100 INJECTION, SOLUTION INTRAVENOUS at 15:50

## 2023-11-08 RX ADMIN — PIPERACILLIN AND TAZOBACTAM 3.38 G: 3; .375 INJECTION, POWDER, FOR SOLUTION INTRAVENOUS at 17:58

## 2023-11-08 RX ADMIN — PIPERACILLIN AND TAZOBACTAM 3.38 G: 3; .375 INJECTION, POWDER, FOR SOLUTION INTRAVENOUS at 09:36

## 2023-11-08 RX ADMIN — ACETAMINOPHEN 650 MG: 650 SOLUTION ORAL at 17:59

## 2023-11-08 RX ADMIN — SUGAMMADEX 200 MG: 100 INJECTION, SOLUTION INTRAVENOUS at 15:53

## 2023-11-08 RX ADMIN — DEXMEDETOMIDINE HYDROCHLORIDE 8 MCG: 100 INJECTION, SOLUTION INTRAVENOUS at 15:59

## 2023-11-08 RX ADMIN — SODIUM CHLORIDE: 9 INJECTION, SOLUTION INTRAVENOUS at 20:32

## 2023-11-08 RX ADMIN — MIDAZOLAM 2 MG: 1 INJECTION INTRAMUSCULAR; INTRAVENOUS at 15:59

## 2023-11-08 ASSESSMENT — ACTIVITIES OF DAILY LIVING (ADL)
ADLS_ACUITY_SCORE: 18
ADLS_ACUITY_SCORE: 18
ADLS_ACUITY_SCORE: 35
ADLS_ACUITY_SCORE: 35
DEPENDENT_IADLS:: INDEPENDENT
ADLS_ACUITY_SCORE: 35
ADLS_ACUITY_SCORE: 18
ADLS_ACUITY_SCORE: 35
ADLS_ACUITY_SCORE: 18
ADLS_ACUITY_SCORE: 35
ADLS_ACUITY_SCORE: 35
ADLS_ACUITY_SCORE: 18
ADLS_ACUITY_SCORE: 35

## 2023-11-08 ASSESSMENT — ENCOUNTER SYMPTOMS
TROUBLE SWALLOWING: 1
FEVER: 1
VOICE CHANGE: 1
SORE THROAT: 1

## 2023-11-08 NOTE — MEDICATION SCRIBE - ADMISSION MEDICATION HISTORY
Medication Scribe Admission Medication History    Admission medication history is complete. The information provided in this note is only as accurate as the sources available at the time of the update.    Information Source(s): Patient via in-person    Pertinent Information: NONE    Changes made to PTA medication list:  Added: CLEOCIN, TYLENOL AND ADVIL  Deleted: None  Changed: None    Medication Affordability:  Not including over the counter (OTC) medications, was there a time in the past 3 months when you did not take your medications as prescribed because of cost?: No    Allergies reviewed with patient and updates made in EHR: yes    Medication History Completed By: Haley Ny 11/8/2023 1:34 AM  Prior to Admission medications    Medication Sig Last Dose Taking? Auth Provider Long Term End Date   acetaminophen (TYLENOL) 500 MG tablet Take 500-1,000 mg by mouth every 6 hours as needed for mild pain 11/7/2023 at PRN Yes Reported, Patient     azelastine (ASTELIN) 0.1 % nasal spray Spray 1 spray into both nostrils 2 times daily More than a month at PRN RARELY Yes Yesica Castanon MD     clindamycin (CLEOCIN) 300 MG capsule Take 300 mg by mouth 3 times daily 11/7/2023 at 1700 Yes Reported, Patient  11/9/23   ibuprofen (ADVIL/MOTRIN) 200 MG tablet Take 200 mg by mouth every 4 hours as needed for pain 11/7/2023 at PRN Yes Reported, Patient

## 2023-11-08 NOTE — ED TRIAGE NOTES
Reports throat pain. State she was on abx and steroids. Finished the course yesterday, states pain and swelling has returned.      Triage Assessment (Adult)       Row Name 11/07/23 0400          Triage Assessment    Airway WDL WDL        Respiratory WDL    Respiratory WDL WDL        Cardiac WDL    Cardiac WDL WDL

## 2023-11-08 NOTE — ANESTHESIA PROCEDURE NOTES
Airway       Patient location during procedure: OR       Procedure Start/Stop Times: 11/8/2023 3:26 PM  Staff -        Anesthesiologist:  Uzair Shetty MD       Other Anesthesia Staff: Gabriel Lopez       Performed By: SRNA  Consent for Airway        Urgency: elective  Indications and Patient Condition       Indications for airway management: kristen-procedural       Induction type:intravenous       Mask difficulty assessment: 2 - vent by mask + OA or adjuvant +/- NMBA    Final Airway Details       Final airway type: endotracheal airway       Successful airway: ETT - single, MIRANDA and Oral  Endotracheal Airway Details        ETT size (mm): 8.0       Cuffed: yes       Successful intubation technique: video laryngoscopy       VL Blade Size: Glidescope 4       Grade View of Cords: 1       Adjucts: stylet       Position: Right       Measured from: lips       Secured at (cm): 24       Bite block used: None    Post intubation assessment        Placement verified by: capnometry, equal breath sounds and chest rise        Number of attempts at approach: 1       Number of other approaches attempted: 0       Secured with: silk tape       Ease of procedure: easy       Dentition: Intact and Unchanged    Medication(s) Administered   Medication Administration Time: 11/8/2023 3:26 PM

## 2023-11-08 NOTE — CONSULTS
Chief Complaint   Patient presents with    Pharyngitis     History of Present Illness  Tan Florence is a 38 year old male with CT documented LEFT peritonsillar abscess.      Past Medical History  Patient Active Problem List   Diagnosis    NO ACTIVE PROBLEMS    Overweight (BMI 25.0-29.9)    Obstructive sleep apnea syndrome    Multiple atypical nevi    Hyperlipidemia    Habitual alcohol use    Colitis    Chronic rhinitis    Gastroesophageal reflux disease, unspecified whether esophagitis present    Peritonsillar abscess     Current Medications    Current Facility-Administered Medications:     HYDROmorphone (DILAUDID) injection 0.5 mg, 0.5 mg, Intravenous, Once PRN, Gabriel Mccracken MD    melatonin tablet 1 mg, 1 mg, Oral, At Bedtime PRN, Gabriel Paula MD    ondansetron (ZOFRAN ODT) ODT tab 4 mg, 4 mg, Oral, Q6H PRN **OR** ondansetron (ZOFRAN) injection 4 mg, 4 mg, Intravenous, Q6H PRN, Gabriel Paula MD    ondansetron (ZOFRAN) injection 4 mg, 4 mg, Intravenous, Q30 Min PRN, Gabriel Mccracken MD    ondansetron (ZOFRAN) injection 4 mg, 4 mg, Intravenous, Q30 Min PRN, Gabriel Mccracken MD    piperacillin-tazobactam (ZOSYN) 3.375 g vial to attach to  mL bag, 3.375 g, Intravenous, Q8H, Gabriel Paula MD    sodium chloride 0.9 % infusion, , Intravenous, Continuous, Gabriel Paula MD, Last Rate: 125 mL/hr at 11/08/23 0654, New Bag at 11/08/23 0654    Current Outpatient Medications:     acetaminophen (TYLENOL) 500 MG tablet, Take 500-1,000 mg by mouth every 6 hours as needed for mild pain, Disp: , Rfl:     azelastine (ASTELIN) 0.1 % nasal spray, Spray 1 spray into both nostrils 2 times daily, Disp: 30 mL, Rfl: 0    clindamycin (CLEOCIN) 300 MG capsule, Take 300 mg by mouth 3 times daily, Disp: , Rfl:     ibuprofen (ADVIL/MOTRIN) 200 MG tablet, Take 200 mg by mouth every 4 hours as needed for pain, Disp: , Rfl:     Allergies  No Known Allergies    Social  "History  Social History     Socioeconomic History    Marital status:      Spouse name: None    Number of children: 0    Years of education: 14    Highest education level: None   Occupational History    Occupation: [employer]     Employer: [employer]   Tobacco Use    Smoking status: Never    Smokeless tobacco: Never   Substance and Sexual Activity    Alcohol use: Yes     Alcohol/week: 5.0 standard drinks of alcohol     Types: 6 drink(s) per week     Comment: couple times per week    Drug use: No    Sexual activity: Yes     Partners: Female     Birth control/protection: Pill   Other Topics Concern    Exercise Yes    Seat Belt Yes    Self-Exams No       Family History  Family History   Problem Relation Age of Onset    Asthma No family hx of     C.A.D. No family hx of     Diabetes No family hx of     Hypertension No family hx of     Cancer - colorectal No family hx of     Prostate Cancer No family hx of        CBC RESULTS:   Recent Labs   Lab Test 11/08/23  0041   WBC 14.1*   RBC 4.73   HGB 13.6   HCT 40.0   MCV 85   MCH 28.8   MCHC 34.0   RDW 12.5           Physical Exam  BP (!) 132/91   Pulse 88   Temp 98.5  F (36.9  C) (Temporal)   Resp 18   Ht 1.778 m (5' 10\")   Wt 93.9 kg (207 lb)   SpO2 95%   BMI 29.70 kg/m      Assessment and Plan     ICD-10-CM    1. Peritonsillar abscess  J36 Case Request: INCISION AND DRAINAGE, ABSCESS, TONSILLAR OR PERITONSILLAR     Case Request: INCISION AND DRAINAGE, ABSCESS, TONSILLAR OR PERITONSILLAR        NPO  Plan on I and D under general anesthesia today at 3 PM at [facility] OR.     Harris Dixon  Department of Otolaryngology-Head and Neck Surgery  [facility]    "

## 2023-11-08 NOTE — H&P (VIEW-ONLY)
NAME: Tan Florence  AGE: 38 year old male  YOB: 1985  MRN: [id number]  EVALUATION DATE & TIME: 2023 11:47 PM    PCP: Alice De Anda    ED PROVIDER: Gabriel Mccracken M.D.      Chief Complaint   Patient presents with    Pharyngitis     FINAL IMPRESSION:  1. Peritonsillar abscess      MEDICAL DECISION MAKIN:16 AM I met with the patient, obtained history, performed an initial exam, and discussed options and plan for diagnostics and treatment here in the ED.   3:06 AM Spoke with ENT, Dr. Dixon who recommends admission and ENT will come to evaluate patient in the morning.  3:12 AM Reevaluated and updated patient with ENT recommendations. Patient is agreeable with plan.  3:27 AM Spoke with [place], Dr. Little who accepts patient for admission.  3:41 AM Reevaluated patient.    Patient was clinically assessed and consented to treatment. After assessment, medical decision making and workup were discussed with the patient. The patient was agreeable to plan for testing, workup, and treatment.  Pertinent Labs & Imaging studies reviewed. (See chart for details)       Medical Decision Making    History:  Supplemental history from: Documented in chart, if applicable  External Record(s) reviewed: Outpatient Record: [facility]  2023    Work Up:  Chart documentation includes differential considered and any EKGs or imaging independently interpreted by provider, where specified.  In additional to work up documented, I considered the following work up: Documented in chart, if applicable.    External consultation:  Discussion of management with another provider: ENT and Hospitalist    Complicating factors:  Care impacted by chronic illness: Hyperlipidemia and Other: GERD  Care affected by social determinants of health: Access to Medical Care and Alcohol Abuse and/or Recreational Drug Use    Disposition considerations: Admit.    Tan Florence is a 38 year old male who presents  with pharyngitis.   Differential diagnosis includes but not limited to bacterial pharyngitis, retropharyngeal abscess, peritonsillar abscess, Ludewig's angina.  Patient with course of pharyngitis initially negative on strep however started on antibiotics on 2 November.  Patient also started on a course of steroids.  Initially improving however worsening the last 2/3 days.  On examination and patient has trismus and unable to open fully to allow obvious elevation of the tonsil.  I can barely see over the tongue the upper arch of the tonsil on the left which does appear enlarged and spread to midline pushing the uvula.  External examination does reveal significant swelling and tenderness on the left side compared to right.  Patient had previous CT which showed some fat stranding on the left tonsil but no abscess.  Repeat labs showed white blood cell count of 14 and elevated CRP.  Additionally repeat CT was obtained and did show a larg 3.5 x 2.5 x 2.5 cm abscess of the left peritonsillar area.  Attempt to visualize and possibly drain in the ER unsuccessful due to trismus and patient having immediate gag reflex to suction or even tongue blade to try to better visualize the tonsillar belly where I could possibly drain it.  Given these difficulties as well as patient's difficulty with secretions.  He is tolerating some secretions but reports when they are thicker he has feeling over them getting stuck and has used suction.  He has no stridor or signs of airway compromise on CT but does have some significant swelling from the peritonsillar abscess as well as some retropharyngeal edema.  Patient discussed with Dr. Dixon from ENT and will plan for drainage.  He already received a dose of Decadron 10 mg and will be given a dose of Zosyn as well.  IV fluids continued patient will remain n.p.o. for surgery.  Additionally patient discussed with resident service for admission for observation following procedure.    0 minutes of  critical care time    MEDICATIONS GIVEN IN THE EMERGENCY:  Medications   ondansetron (ZOFRAN) injection 4 mg (has no administration in time range)   ondansetron (ZOFRAN) injection 4 mg (has no administration in time range)   HYDROmorphone (DILAUDID) injection 0.5 mg (has no administration in time range)   sodium chloride 0.9% BOLUS 1,000 mL (0 mLs Intravenous Stopped 11/8/23 0159)   ketorolac (TORADOL) injection 15 mg (15 mg Intravenous $Given 11/8/23 0038)   iopamidol (ISOVUE-370) solution 90 mL (90 mLs Intravenous $Given 11/8/23 0117)   dexAMETHasone (DECADRON) injection 10 mg (10 mg Intravenous $Given 11/8/23 0130)   piperacillin-tazobactam (ZOSYN) 3.375 g vial to attach to  mL bag (3.375 g Intravenous $New Bag 11/8/23 0320)       NEW PRESCRIPTIONS STARTED AT TODAY'S ER VISIT:  New Prescriptions    No medications on file          =================================================================    HPI    Patient information was obtained from: patient    Use of : N/A        Tan Florence is a 38 year old male with a past medical history of GERD, HLD, obesity habitual alcohol use, who presents sore throat.     Patient reports about 1.5 weeks ago, he developed a persistent sore throat. He decided to go get checked out on 10/28/2023. During this visit, his strep and covid test was negative. He was given a dose of steroids (decadron) during visit. However, the sore throat continued to persist so he decided to go to  on 11/2/2023 for reevaluation. Patient was given decadron and clindamycin and initially reported some improvement for about 3-4 days. About 1.5 days ago near the end of his steroid course, he notes the swelling and sore throat returned even while on antibiotics. Tonight, the sore throat acutely worsened and he feels like any thicker liquids get caught in the back of his throat. He notes some difficulty swallowing and needs to spit it back up and remain upright in order not to choke.  He otherwise denies associating wheezing, shortness of breath, and fever. He does note some episodes of diaphoresis and generalized body aches even while on antibiotics. There were no other concerns/complaints at this time.    Per chart review, patient was seen at [facility] on 11/2/2023 for throat problem. Patient reported worsening sore throat. He was seen previously and had a negative covid and strep test. Mono was also negative. CT showed:There is bilateral prominence and enhancement of the palatine tonsils which given history is compatible with infectious/inflammatory change. Although there is stranding of the parapharyngeal fat on the left, no findings for tonsillar or peritonsillar abscess identified. 2. There is bilateral cervical lymphadenopathy, nonspecific but favored to be reactive.No suppurative or necrotic lymph nodes are present. He was ultimately discharged home with decadron and clindamycin.      REVIEW OF SYSTEMS   Review of Systems   Constitutional:  Positive for fever.   HENT:  Positive for sore throat, trouble swallowing and voice change.         PAST MEDICAL HISTORY:  Past Medical History:   Diagnosis Date    Gastroesophageal reflux disease     NO ACTIVE PROBLEMS 06/16/2008    Obese        PAST SURGICAL HISTORY:  Past Surgical History:   Procedure Laterality Date    ESOPHAGOSCOPY, GASTROSCOPY, DUODENOSCOPY (EGD), COMBINED N/A 4/11/2023    Procedure: ESOPHAGOGASTRODUODENOSCOPY, WITH BIOPSY;  Surgeon: Sumit David DO;  Location: [facility] OR    NO HISTORY OF SURGERY         CURRENT MEDICATIONS:      Current Facility-Administered Medications:     HYDROmorphone (DILAUDID) injection 0.5 mg, 0.5 mg, Intravenous, Once PRN, Gabriel Mccracken MD    ondansetron (ZOFRAN) injection 4 mg, 4 mg, Intravenous, Q30 Min PRN, Gabriel Mccracken MD    ondansetron (ZOFRAN) injection 4 mg, 4 mg, Intravenous, Q30 Min PRN, Gabriel Mccracken MD    Current Outpatient  "Medications:     acetaminophen (TYLENOL) 500 MG tablet, Take 500-1,000 mg by mouth every 6 hours as needed for mild pain, Disp: , Rfl:     azelastine (ASTELIN) 0.1 % nasal spray, Spray 1 spray into both nostrils 2 times daily, Disp: 30 mL, Rfl: 0    clindamycin (CLEOCIN) 300 MG capsule, Take 300 mg by mouth 3 times daily, Disp: , Rfl:     ibuprofen (ADVIL/MOTRIN) 200 MG tablet, Take 200 mg by mouth every 4 hours as needed for pain, Disp: , Rfl:     ALLERGIES:  No Known Allergies    FAMILY HISTORY:  Family History   Problem Relation Age of Onset    Asthma No family hx of     C.A.D. No family hx of     Diabetes No family hx of     Hypertension No family hx of     Cancer - colorectal No family hx of     Prostate Cancer No family hx of        SOCIAL HISTORY:   Social History     Socioeconomic History    Marital status:     Number of children: 0    Years of education: 14   Occupational History    Occupation: [employer]     Employer: [employer]   Tobacco Use    Smoking status: Never    Smokeless tobacco: Never   Substance and Sexual Activity    Alcohol use: Yes     Alcohol/week: 5.0 standard drinks of alcohol     Types: 6 drink(s) per week     Comment: couple times per week    Drug use: No    Sexual activity: Yes     Partners: Female     Birth control/protection: Pill   Other Topics Concern    Exercise Yes    Seat Belt Yes    Self-Exams No       PHYSICAL EXAM:    Vitals: BP (!) 158/100   Pulse 88   Temp 98.5  F (36.9  C) (Temporal)   Resp 18   Ht 1.778 m (5' 10\")   Wt 93.9 kg (207 lb)   SpO2 98%   BMI 29.70 kg/m     Physical Exam  Vitals and nursing note reviewed.   Constitutional:       General: He is not in acute distress.     Appearance: He is well-developed.   HENT:      Head: Normocephalic.      Mouth/Throat:      Mouth: Mucous membranes are dry.      Pharynx: Pharyngeal swelling, oropharyngeal exudate and posterior oropharyngeal erythema present.      Tonsils: Tonsillar exudate and tonsillar abscess " present. 3+ on the left.   Cardiovascular:      Rate and Rhythm: Normal rate and regular rhythm.      Heart sounds: Normal heart sounds.   Pulmonary:      Effort: Pulmonary effort is normal. No respiratory distress.      Breath sounds: Normal breath sounds. No stridor.   Musculoskeletal:      Cervical back: Normal range of motion and neck supple.   Lymphadenopathy:      Cervical: Cervical adenopathy present.   Skin:     General: Skin is warm and dry.      Coloration: Skin is not pale.   Neurological:      General: No focal deficit present.      Mental Status: He is alert.   Psychiatric:         Behavior: Behavior normal.        LAB:  All pertinent labs reviewed and interpreted.  Labs Ordered and Resulted from Time of ED Arrival to Time of ED Departure   BASIC METABOLIC PANEL - Abnormal       Result Value    Sodium 137      Potassium 3.8      Chloride 102      Carbon Dioxide (CO2) 25      Anion Gap 10      Urea Nitrogen 11.8      Creatinine 0.81      GFR Estimate >90      Calcium 9.6      Glucose 101 (*)    CRP INFLAMMATION - Abnormal    CRP Inflammation 54.10 (*)    CBC WITH PLATELETS AND DIFFERENTIAL - Abnormal    WBC Count 14.1 (*)     RBC Count 4.73      Hemoglobin 13.6      Hematocrit 40.0      MCV 85      MCH 28.8      MCHC 34.0      RDW 12.5      Platelet Count 313      % Neutrophils 71      % Lymphocytes 19      % Monocytes 8      % Eosinophils 1      % Basophils 0      % Immature Granulocytes 1      NRBCs per 100 WBC 0      Absolute Neutrophils 10.1 (*)     Absolute Lymphocytes 2.6      Absolute Monocytes 1.1      Absolute Eosinophils 0.1      Absolute Basophils 0.1      Absolute Immature Granulocytes 0.2      Absolute NRBCs 0.0     RBC AND PLATELET MORPHOLOGY - Abnormal    Platelet Assessment        Value: Automated Count Confirmed. Platelet morphology is normal.    Acanthocytes        Shekhar Rods        Basophilic Stippling        Bite Cells        Blister Cells        Pennock Cells        Elliptocytes         Hgb C Crystals        Olivas-Jolly Bodies        Hypersegmented Neutrophils        Polychromasia        RBC agglutination        RBC Fragments        Reactive Lymphocytes Present (*)     Rouleaux        Sickle Cells        Smudge Cells        Spherocytes        Stomatocytes        Target Cells        Teardrop Cells        Toxic Neutrophils        RBC Morphology Confirmed RBC Indices         RADIOLOGY:  Soft tissue neck CT w contrast   Final Result   IMPRESSION:    1.  Interval development of a 3.4 x 2.4 x 2.4 cm left peritonsillar abscess.   2.  Edema of the ipsilateral oropharyngeal and hypopharyngeal mucosal space with moderate narrowing of the oral pharyngeal airway.   3.  Small amount of retropharyngeal fluid eccentric left.   4.  Reactive upper cervical chain lymphadenopathy.             EKG:   None    PROCEDURES:   Procedures       I, Janell Hannon, am serving as a scribe to document services personally performed by Dr. Gabriel Mccracken  based on my observation and the provider's statements to me. I, Gabriel Mccracken MD attest that Janell Hannon is acting in a scribe capacity, has observed my performance of the services and has documented them in accordance with my direction.      Gabriel Mccracken M.D.  Emergency Medicine  [facility] Emergency Department       Gabriel Mccracken MD  11/08/23 0359

## 2023-11-08 NOTE — OP NOTE
OTOLARYNGOLOGY OPERATIVE NOTE    PREOPERATIVE DIAGNOSES:  Peritonsillar Abscess LEFT      POSTOPERATIVE DIAGNOSES: same      PROCEDURE PERFORMED:  Incision and Drainage of Peritonsillar Abscess    SURGEON: Harris Dixon MD    BLOOD LOSS: Less than 5 mL    COMPLICATIONS: None.     SPECIMENS: aspirate sent for culture     ANESTHESIA: GETA.     FINDINGS: 1.5 ml of gross pus aspirated from LEFT peritonsillar space; 3+ cryptic tonsils;  Uvula midline        OPERATIVE PROCEDURE: After being taken to the operating room and induction of general endotracheal tube anesthesia, a pause was conducted to identify the patient by name, birthday, and procedure.    The bed was  rotated 90 degrees.Then a shoulder roll and head warp were placed. I suspended the patient from the Patel stand using a SimpleHoney mouthgag, then slipped a small soft catheter through each nasal cavity out of the mouth to retract the soft palate forward.      3 ml sof 1%  lidocaine with epi at 1:100,000 was injected into the LEFT tonsillar pillar  A 15 blade was use to make stab incision superiorly.  A curved hemostat was used to open up the peristonsillar space.  No purulence was encountered.   Bipolar  cautery was used to achieve hemostasis.  A single 4-0 chromic suture is then used to close the stab incision.     An OG tube is then passed to empty the proximal esophagus of secretions.

## 2023-11-08 NOTE — H&P
[facility]    History and Physical - Hospitalist Service       Date of Admission:  11/7/2023    Assessment & Plan      Tan Florence is a 38 year old male admitted on 11/7/2023. He has a past medical history significant for GERD, chronic paranasal sinus congestion.  Admitted for left-sided peritonsillar abscess.    Left-sided peritonsillar abscess.  Leukocytosis  Presented with progressive sore throat, trismus and subjective intermittent upper airway obstruction.  CT demonstrating 3.4 x 2.4 x 2.4 cm left sided peritonsillar abscess with evidence of ipsilateral oropharyngeal and hypopharyngeal edema and moderate narrowing of the oropharyngeal airway.  Recently negative for both group A strep and Jeovanny-Barr virus.  Overall appears well on exam, no evidence of severe upper airway obstruction on exam.  With progression of symptoms while on outpatient clindamycin,  Zosyn was started in the ED.  We will continue for now.  Definitive management includes incision and drainage, ENT following.  -ENT consulted  -Plan for surgical I&D today  -Continue Zosyn  -Consider repeat Decadron dose  -N.p.o.  -Maintenance fluid  -Oral suctioning as needed  -One-time dose of Dilaudid as needed  -CBC        Preop clearance: No significant past medical history. Patient's functional capacity is Excellent: >7 METS: Jogging (10 minute mile), scrubbing floors, tennis  Revised Cardiac Risk Index for Pre-Operative Risk  High risk surgery? No  History of ischemic heart disease? No  History of CHF? No  History of cerebrovascular disease? No  Pre-op treatment w/ insulin? No  Pre-op creatinine > 2.0? No    0 points = class I risk 0.4% risk of major cardiac event      Chronic conditions  Chronic paranasal sinus congestion -holding azelastine  GERD -holding pantoprazole         Observation Goals: -diagnostic tests and consults completed and resulted, -vital signs normal or at patient baseline, Nurse to notify provider  "when observation goals have been met and patient is ready for discharge.    Diet: NPO per Anesthesia Guidelines for Procedure/Surgery Except for: Meds  DVT Prophylaxis: Low Risk/Ambulatory with no VTE prophylaxis indicated  Abdi Catheter: Not present  Fluids: 125ml/hr NS  Lines: None     Cardiac Monitoring: None  Code Status: Full Code    Clinically Significant Risk Factors Present on Admission                       # Overweight: Estimated body mass index is 29.7 kg/m  as calculated from the following:    Height as of this encounter: 1.778 m (5' 10\").    Weight as of this encounter: 93.9 kg (207 lb).              Disposition Plan      Expected Discharge Date: 11/09/2023                The patient's care will be discussed with Attending physician at morning report      Gabriel Paula MD  Hospitalist Service  [facility]  Securely message with [employer] (more info)  Text page via [employer] Paging/Directory   ______________________________________________________________________    Chief Complaint   Sore throat, pain with swallowing    History is obtained from the patient    History of Present Illness   Tan Florence is a 38 year old male who presents with worsening pharyngitis, dysphagia    Patient has had persistent sore throat for about a week and a half.  Symptoms have progressed since that time.  He now has difficulty with swallowing, also notes difficulty breathing when he tilts his head back, which ultimately prompted his presentation to the ED.  He has noted worsening pain and swelling in the left aspect of his neck.  Having a difficult time eating food.  Otherwise denies any shortness of breath, has endorsed subjective fevers and chills, has not checked his temperature at home.    He has had multiple urgent care visits for the symptoms, initially on 10/28/2023 where group A strep and COVID were negative.  He was given a dose of steroids.  He represented to urgent care on 11/2/2023, " underwent a CT of his neck demonstrating bilateral prominence and enhancement of the palate teen tonsils consistent with infectious inflammatory change.  Evidence of stranding of the parapharyngeal fat on the left, no evidence of tonsillar or peritonsillar abscess.  At that visit, he was given a course of clindamycin and Decadron.  Had some improvement following initiation of Decadron, however symptoms returned.          Past Medical History    Past Medical History:   Diagnosis Date    Gastroesophageal reflux disease     NO ACTIVE PROBLEMS 06/16/2008    Obese        Past Surgical History   Past Surgical History:   Procedure Laterality Date    ESOPHAGOSCOPY, GASTROSCOPY, DUODENOSCOPY (EGD), COMBINED N/A 4/11/2023    Procedure: ESOPHAGOGASTRODUODENOSCOPY, WITH BIOPSY;  Surgeon: Sumit David DO;  Location: [facility] OR    NO HISTORY OF SURGERY         Prior to Admission Medications   Prior to Admission Medications   Prescriptions Last Dose Informant Patient Reported? Taking?   acetaminophen (TYLENOL) 500 MG tablet 11/7/2023 at PRN Self Yes Yes   Sig: Take 500-1,000 mg by mouth every 6 hours as needed for mild pain   azelastine (ASTELIN) 0.1 % nasal spray More than a month at PRN RARELY Self No Yes   Sig: Spray 1 spray into both nostrils 2 times daily   clindamycin (CLEOCIN) 300 MG capsule 11/7/2023 at 1700 Self Yes Yes   Sig: Take 300 mg by mouth 3 times daily   ibuprofen (ADVIL/MOTRIN) 200 MG tablet 11/7/2023 at PRN Self Yes Yes   Sig: Take 200 mg by mouth every 4 hours as needed for pain      Facility-Administered Medications: None        Social History   I have reviewed this patient's social history and updated it with pertinent information if needed.  Social History     Tobacco Use    Smoking status: Never    Smokeless tobacco: Never   Substance Use Topics    Alcohol use: Yes     Alcohol/week: 5.0 standard drinks of alcohol     Types: 6 drink(s) per week     Comment: couple times per week    Drug use:  No      Works as a .    Physical Exam   Vital Signs: Temp: 98.5  F (36.9  C) Temp src: Temporal BP: (!) 132/91 Pulse: 88   Resp: 18 SpO2: 95 % O2 Device: None (Room air)    Weight: 207 lbs 0 oz    General Appearance: Comfortable.  No acute distress.  Eyes: Pupils equal round reactive.  Extraocular muscles appear intact.  Clear sclera.  HEENT: Moist mucous membranes.  Overall difficult exam with trismus.  Left-sided oropharyngeal swelling.  Does appear some exudate on the left.  Neck -swelling and cervical adenopathy present on the left.  Neck is supple.  Good range of motion.  No stridor.  Respiratory: Comfortable respiratory effort.  No crackles or wheezing.  Cardiovascular: Regular rate rhythm.  No murmurs.  GI: Nondistended.  Bowel sounds present.  Soft nontender.  Skin: Warm and well perfused.  No visible rash on visualized skin.  Musculoskeletal: Moving all 4 extremities.  Grossly normal to inspection.  Neurologic: Alert oriented to person place situation.  No gross focal deficits.  Psychiatric: Pleasant affect.  Logical thought process.  Good insight.        Data     I have personally reviewed the following data over the past 24 hrs:    14.1 (H)  \   13.6   / 313     137 102 11.8 /  101 (H)   3.8 25 0.81 \     Procal: N/A CRP: 54.10 (H) Lactic Acid: N/A

## 2023-11-08 NOTE — INTERVAL H&P NOTE
"I have reviewed the surgical (or preoperative) H&P that is linked to this encounter, and examined the patient. There are no significant changes    Clinical Conditions Present on Arrival:  Clinically Significant Risk Factors Present on Admission                  # Overweight: Estimated body mass index is 29.7 kg/m  as calculated from the following:    Height as of this encounter: 1.778 m (5' 10\").    Weight as of this encounter: 93.9 kg (207 lb).       "

## 2023-11-08 NOTE — ED PROVIDER NOTES
NAME: Tan Florence  AGE: 38 year old male  YOB: 1985  MRN: [id number]  EVALUATION DATE & TIME: 2023 11:47 PM    PCP: Alice De Anda    ED PROVIDER: Gabriel Mccracken M.D.      Chief Complaint   Patient presents with    Pharyngitis     FINAL IMPRESSION:  1. Peritonsillar abscess      MEDICAL DECISION MAKIN:16 AM I met with the patient, obtained history, performed an initial exam, and discussed options and plan for diagnostics and treatment here in the ED.   3:06 AM Spoke with ENT, Dr. Dixon who recommends admission and ENT will come to evaluate patient in the morning.  3:12 AM Reevaluated and updated patient with ENT recommendations. Patient is agreeable with plan.  3:27 AM Spoke with [place], Dr. Little who accepts patient for admission.  3:41 AM Reevaluated patient.    Patient was clinically assessed and consented to treatment. After assessment, medical decision making and workup were discussed with the patient. The patient was agreeable to plan for testing, workup, and treatment.  Pertinent Labs & Imaging studies reviewed. (See chart for details)       Medical Decision Making    History:  Supplemental history from: Documented in chart, if applicable  External Record(s) reviewed: Outpatient Record: [facility]  2023    Work Up:  Chart documentation includes differential considered and any EKGs or imaging independently interpreted by provider, where specified.  In additional to work up documented, I considered the following work up: Documented in chart, if applicable.    External consultation:  Discussion of management with another provider: ENT and Hospitalist    Complicating factors:  Care impacted by chronic illness: Hyperlipidemia and Other: GERD  Care affected by social determinants of health: Access to Medical Care and Alcohol Abuse and/or Recreational Drug Use    Disposition considerations: Admit.    Tan Florence is a 38 year old male who presents  with pharyngitis.   Differential diagnosis includes but not limited to bacterial pharyngitis, retropharyngeal abscess, peritonsillar abscess, Ludewig's angina.  Patient with course of pharyngitis initially negative on strep however started on antibiotics on 2 November.  Patient also started on a course of steroids.  Initially improving however worsening the last 2/3 days.  On examination and patient has trismus and unable to open fully to allow obvious elevation of the tonsil.  I can barely see over the tongue the upper arch of the tonsil on the left which does appear enlarged and spread to midline pushing the uvula.  External examination does reveal significant swelling and tenderness on the left side compared to right.  Patient had previous CT which showed some fat stranding on the left tonsil but no abscess.  Repeat labs showed white blood cell count of 14 and elevated CRP.  Additionally repeat CT was obtained and did show a larg 3.5 x 2.5 x 2.5 cm abscess of the left peritonsillar area.  Attempt to visualize and possibly drain in the ER unsuccessful due to trismus and patient having immediate gag reflex to suction or even tongue blade to try to better visualize the tonsillar belly where I could possibly drain it.  Given these difficulties as well as patient's difficulty with secretions.  He is tolerating some secretions but reports when they are thicker he has feeling over them getting stuck and has used suction.  He has no stridor or signs of airway compromise on CT but does have some significant swelling from the peritonsillar abscess as well as some retropharyngeal edema.  Patient discussed with Dr. Dixon from ENT and will plan for drainage.  He already received a dose of Decadron 10 mg and will be given a dose of Zosyn as well.  IV fluids continued patient will remain n.p.o. for surgery.  Additionally patient discussed with resident service for admission for observation following procedure.    0 minutes of  critical care time    MEDICATIONS GIVEN IN THE EMERGENCY:  Medications   ondansetron (ZOFRAN) injection 4 mg (has no administration in time range)   ondansetron (ZOFRAN) injection 4 mg (has no administration in time range)   HYDROmorphone (DILAUDID) injection 0.5 mg (has no administration in time range)   sodium chloride 0.9% BOLUS 1,000 mL (0 mLs Intravenous Stopped 11/8/23 0159)   ketorolac (TORADOL) injection 15 mg (15 mg Intravenous $Given 11/8/23 0038)   iopamidol (ISOVUE-370) solution 90 mL (90 mLs Intravenous $Given 11/8/23 0117)   dexAMETHasone (DECADRON) injection 10 mg (10 mg Intravenous $Given 11/8/23 0130)   piperacillin-tazobactam (ZOSYN) 3.375 g vial to attach to  mL bag (3.375 g Intravenous $New Bag 11/8/23 0320)       NEW PRESCRIPTIONS STARTED AT TODAY'S ER VISIT:  New Prescriptions    No medications on file          =================================================================    HPI    Patient information was obtained from: patient    Use of : N/A        Tan Florence is a 38 year old male with a past medical history of GERD, HLD, obesity habitual alcohol use, who presents sore throat.     Patient reports about 1.5 weeks ago, he developed a persistent sore throat. He decided to go get checked out on 10/28/2023. During this visit, his strep and covid test was negative. He was given a dose of steroids (decadron) during visit. However, the sore throat continued to persist so he decided to go to  on 11/2/2023 for reevaluation. Patient was given decadron and clindamycin and initially reported some improvement for about 3-4 days. About 1.5 days ago near the end of his steroid course, he notes the swelling and sore throat returned even while on antibiotics. Tonight, the sore throat acutely worsened and he feels like any thicker liquids get caught in the back of his throat. He notes some difficulty swallowing and needs to spit it back up and remain upright in order not to choke.  He otherwise denies associating wheezing, shortness of breath, and fever. He does note some episodes of diaphoresis and generalized body aches even while on antibiotics. There were no other concerns/complaints at this time.    Per chart review, patient was seen at [facility] on 11/2/2023 for throat problem. Patient reported worsening sore throat. He was seen previously and had a negative covid and strep test. Mono was also negative. CT showed:There is bilateral prominence and enhancement of the palatine tonsils which given history is compatible with infectious/inflammatory change. Although there is stranding of the parapharyngeal fat on the left, no findings for tonsillar or peritonsillar abscess identified. 2. There is bilateral cervical lymphadenopathy, nonspecific but favored to be reactive.No suppurative or necrotic lymph nodes are present. He was ultimately discharged home with decadron and clindamycin.      REVIEW OF SYSTEMS   Review of Systems   Constitutional:  Positive for fever.   HENT:  Positive for sore throat, trouble swallowing and voice change.         PAST MEDICAL HISTORY:  Past Medical History:   Diagnosis Date    Gastroesophageal reflux disease     NO ACTIVE PROBLEMS 06/16/2008    Obese        PAST SURGICAL HISTORY:  Past Surgical History:   Procedure Laterality Date    ESOPHAGOSCOPY, GASTROSCOPY, DUODENOSCOPY (EGD), COMBINED N/A 4/11/2023    Procedure: ESOPHAGOGASTRODUODENOSCOPY, WITH BIOPSY;  Surgeon: Sumit David DO;  Location: [facility] OR    NO HISTORY OF SURGERY         CURRENT MEDICATIONS:      Current Facility-Administered Medications:     HYDROmorphone (DILAUDID) injection 0.5 mg, 0.5 mg, Intravenous, Once PRN, Gabriel Mccracken MD    ondansetron (ZOFRAN) injection 4 mg, 4 mg, Intravenous, Q30 Min PRN, Gabriel Mccracken MD    ondansetron (ZOFRAN) injection 4 mg, 4 mg, Intravenous, Q30 Min PRN, Gabriel Mccracken MD    Current Outpatient  "Medications:     acetaminophen (TYLENOL) 500 MG tablet, Take 500-1,000 mg by mouth every 6 hours as needed for mild pain, Disp: , Rfl:     azelastine (ASTELIN) 0.1 % nasal spray, Spray 1 spray into both nostrils 2 times daily, Disp: 30 mL, Rfl: 0    clindamycin (CLEOCIN) 300 MG capsule, Take 300 mg by mouth 3 times daily, Disp: , Rfl:     ibuprofen (ADVIL/MOTRIN) 200 MG tablet, Take 200 mg by mouth every 4 hours as needed for pain, Disp: , Rfl:     ALLERGIES:  No Known Allergies    FAMILY HISTORY:  Family History   Problem Relation Age of Onset    Asthma No family hx of     C.A.D. No family hx of     Diabetes No family hx of     Hypertension No family hx of     Cancer - colorectal No family hx of     Prostate Cancer No family hx of        SOCIAL HISTORY:   Social History     Socioeconomic History    Marital status:     Number of children: 0    Years of education: 14   Occupational History    Occupation: [employer]     Employer: [employer]   Tobacco Use    Smoking status: Never    Smokeless tobacco: Never   Substance and Sexual Activity    Alcohol use: Yes     Alcohol/week: 5.0 standard drinks of alcohol     Types: 6 drink(s) per week     Comment: couple times per week    Drug use: No    Sexual activity: Yes     Partners: Female     Birth control/protection: Pill   Other Topics Concern    Exercise Yes    Seat Belt Yes    Self-Exams No       PHYSICAL EXAM:    Vitals: BP (!) 158/100   Pulse 88   Temp 98.5  F (36.9  C) (Temporal)   Resp 18   Ht 1.778 m (5' 10\")   Wt 93.9 kg (207 lb)   SpO2 98%   BMI 29.70 kg/m     Physical Exam  Vitals and nursing note reviewed.   Constitutional:       General: He is not in acute distress.     Appearance: He is well-developed.   HENT:      Head: Normocephalic.      Mouth/Throat:      Mouth: Mucous membranes are dry.      Pharynx: Pharyngeal swelling, oropharyngeal exudate and posterior oropharyngeal erythema present.      Tonsils: Tonsillar exudate and tonsillar abscess " present. 3+ on the left.   Cardiovascular:      Rate and Rhythm: Normal rate and regular rhythm.      Heart sounds: Normal heart sounds.   Pulmonary:      Effort: Pulmonary effort is normal. No respiratory distress.      Breath sounds: Normal breath sounds. No stridor.   Musculoskeletal:      Cervical back: Normal range of motion and neck supple.   Lymphadenopathy:      Cervical: Cervical adenopathy present.   Skin:     General: Skin is warm and dry.      Coloration: Skin is not pale.   Neurological:      General: No focal deficit present.      Mental Status: He is alert.   Psychiatric:         Behavior: Behavior normal.        LAB:  All pertinent labs reviewed and interpreted.  Labs Ordered and Resulted from Time of ED Arrival to Time of ED Departure   BASIC METABOLIC PANEL - Abnormal       Result Value    Sodium 137      Potassium 3.8      Chloride 102      Carbon Dioxide (CO2) 25      Anion Gap 10      Urea Nitrogen 11.8      Creatinine 0.81      GFR Estimate >90      Calcium 9.6      Glucose 101 (*)    CRP INFLAMMATION - Abnormal    CRP Inflammation 54.10 (*)    CBC WITH PLATELETS AND DIFFERENTIAL - Abnormal    WBC Count 14.1 (*)     RBC Count 4.73      Hemoglobin 13.6      Hematocrit 40.0      MCV 85      MCH 28.8      MCHC 34.0      RDW 12.5      Platelet Count 313      % Neutrophils 71      % Lymphocytes 19      % Monocytes 8      % Eosinophils 1      % Basophils 0      % Immature Granulocytes 1      NRBCs per 100 WBC 0      Absolute Neutrophils 10.1 (*)     Absolute Lymphocytes 2.6      Absolute Monocytes 1.1      Absolute Eosinophils 0.1      Absolute Basophils 0.1      Absolute Immature Granulocytes 0.2      Absolute NRBCs 0.0     RBC AND PLATELET MORPHOLOGY - Abnormal    Platelet Assessment        Value: Automated Count Confirmed. Platelet morphology is normal.    Acanthocytes        Shekhar Rods        Basophilic Stippling        Bite Cells        Blister Cells        Henniker Cells        Elliptocytes         Hgb C Crystals        Olivas-Jolly Bodies        Hypersegmented Neutrophils        Polychromasia        RBC agglutination        RBC Fragments        Reactive Lymphocytes Present (*)     Rouleaux        Sickle Cells        Smudge Cells        Spherocytes        Stomatocytes        Target Cells        Teardrop Cells        Toxic Neutrophils        RBC Morphology Confirmed RBC Indices         RADIOLOGY:  Soft tissue neck CT w contrast   Final Result   IMPRESSION:    1.  Interval development of a 3.4 x 2.4 x 2.4 cm left peritonsillar abscess.   2.  Edema of the ipsilateral oropharyngeal and hypopharyngeal mucosal space with moderate narrowing of the oral pharyngeal airway.   3.  Small amount of retropharyngeal fluid eccentric left.   4.  Reactive upper cervical chain lymphadenopathy.             EKG:   None    PROCEDURES:   Procedures       I, Janell Hannon, am serving as a scribe to document services personally performed by Dr. Gabriel Mccracken  based on my observation and the provider's statements to me. I, Gabriel Mccracken MD attest that Janell Hannon is acting in a scribe capacity, has observed my performance of the services and has documented them in accordance with my direction.      Gabriel Mccracken M.D.  Emergency Medicine  [facility] Emergency Department       Gabriel Mccracken MD  11/08/23 0359

## 2023-11-08 NOTE — UTILIZATION REVIEW
Admission Status; Secondary Review Determination       Under the authority of the Utilization Management Committee, the utilization review process indicated a secondary review on the above patient. The review outcome is based on review of the medical records, discussions with staff, and applying clinical experience noted on the date of the review.     (x) Inpatient Status Appropriate - This patient's medical care is consistent with medical management for inpatient care and reasonable inpatient medical practice.     RATIONALE FOR DETERMINATION     Ms. Florence is a 37 yo male pt with PMH of GERD and chronic sinus congestion who presents to the ED with progressive sore throat, trismus and subjective intermittent upper airway obstruction despite being on outpt abx for several days.  CT imaging reveales left-sided peritonsillar abscess with edema and associated mod narrowing of the oropharyngeal airway.  Continues on IV steroids and IV ab q8h.  ENT consulted; NPO and on IVF.  Planning to go to OR today for surgical I&D.      At this time, he is requiring ongoing inpatient evaluation, treatment and clinical monitoring.       At the time of admission with the information available to the attending physician more than 2 nights Hospital complex care was anticipated, based on patient risk of adverse outcome if treated as outpatient and complex care required. Inpatient admission is recommended.     The information on this document is developed by the utilization review team in order for the business office to ensure compliance. This only denotes the appropriateness of proper admission status and does not reflect the quality of care rendered.   The definitions of Inpatient Status and Observation Status used in making the determination above are those provided in the CMS Coverage Manual, Chapter 1 and Chapter 6, section 70.4.         Sincerely,     Theresa De Los Santos, DO  Utilization Review  Physician Advisor  [facility].

## 2023-11-08 NOTE — ANESTHESIA CARE TRANSFER NOTE
Patient: Tan Florence    Procedure: Procedure(s):  INCISION AND DRAINAGE, ABSCESS, TONSILLAR OR PERITONSILLAR       Diagnosis: Peritonsillar abscess [J36]  Diagnosis Additional Information: No value filed.    Anesthesia Type:   General     Note:    Oropharynx: oropharynx clear of all foreign objects and spontaneously breathing  Level of Consciousness: awake  Oxygen Supplementation: face mask  Level of Supplemental Oxygen (L/min / FiO2): 8  Independent Airway: airway patency satisfactory and stable  Dentition: dentition unchanged  Vital Signs Stable: post-procedure vital signs reviewed and stable  Report to RN Given: handoff report given  Patient transferred to: PACU  Comments:     Patient woke up aggressive. Additional sedation given.  PACU nurse notified.  Handoff Report: Identifed the Patient, Identified the Reponsible Provider, Reviewed the pertinent medical history, Discussed the surgical course, Reviewed Intra-OP anesthesia mangement and issues during anesthesia, Set expectations for post-procedure period and Allowed opportunity for questions and acknowledgement of understanding      Vitals:  Vitals Value Taken Time   /67 11/08/23 1615   Temp 37.2  C (99  F) 11/08/23 1611   Pulse 103 11/08/23 1616   Resp 18 11/08/23 1616   SpO2 95 % 11/08/23 1616   Vitals shown include unfiled device data.    Electronically Signed By: John Rios  November 8, 2023  4:18 PM

## 2023-11-08 NOTE — PROGRESS NOTES
[facility]    Medicine Progress Note - Hospitalist Service    Date of Admission:  11/7/2023    Assessment & Plan   Tan Florence is a 38 year old male admitted on 11/7/2023. He has a past medical history significant for GERD and chronic paranasal sinus congestion. Admitted for left-sided peritonsillar abscess.    Left-sided peritonsillar abscess.  Leukocytosis  Presented with progressive sore throat, trismus and subjective intermittent upper airway obstruction. CT demonstrating 3.4 x 2.4 x 2.4 cm left sided peritonsillar abscess with evidence of ipsilateral oropharyngeal and hypopharyngeal edema and moderate narrowing of the oropharyngeal airway.  Recently negative for both group A strep and Jeovanny-Barr virus.  Overall appears well on exam, no evidence of severe upper airway obstruction on exam.  With progression of symptoms while on outpatient clindamycin,  Zosyn was started in the ED. Doing well 11/8 AM w/ no signs of airway compromise, plan for OR later today for I&D w/ ENT.   -ENT consulted, appreciate their recs   > To OR for I&D this afternoon  - Continue Zosyn  - Consider repeat Decadron dose  - N.p.o.  - Maintenance fluid  - Oral suctioning as needed  - One-time dose of Dilaudid as needed  - AM CBCs    Preop clearance: No significant past medical history. Patient's functional capacity is Excellent: >7 METS: Jogging (10 minute mile), scrubbing floors, tennis  Revised Cardiac Risk Index for Pre-Operative Risk  High risk surgery? No  History of ischemic heart disease? No  History of CHF? No  History of cerebrovascular disease? No  Pre-op treatment w/ insulin? No  Pre-op creatinine > 2.0? No    0 points = class I risk 0.4% risk of major cardiac event    Chronic conditions  Chronic paranasal sinus congestion -holding azelastine  GERD -holding pantoprazole          Diet: NPO per Anesthesia Guidelines for Procedure/Surgery Except for: Meds    DVT Prophylaxis: Low Risk/Ambulatory with  "no VTE prophylaxis indicated  Abdi Catheter: Not present  Lines: None     Cardiac Monitoring: None  Code Status: Full Code      Clinically Significant Risk Factors Present on Admission                       # Overweight: Estimated body mass index is 29.7 kg/m  as calculated from the following:    Height as of this encounter: 1.778 m (5' 10\").    Weight as of this encounter: 93.9 kg (207 lb).              Disposition Plan      Expected Discharge Date: 11/10/2023      Destination: home with family            The patient's care was discussed with the Attending Physician, Dr. Gan .    Brice Villegas MD  Hospitalist Service  [facility]  Securely message with [employer] (more info)  Text page via [facility] Paging/Directory   ______________________________________________________________________    Interval History   Pt reports pain is well controlled at this time, he is however hungry and would like to eat after his I&D this afternoon. No questions or concerns at this time, breathing is comfortable.     Physical Exam   Vital Signs: Temp: 99.2  F (37.3  C) Temp src: Oral BP: 138/87 Pulse: 95   Resp: 18 SpO2: 94 % O2 Device: None (Room air)    Weight: 207 lbs 0 oz    General Appearance: Comfortable. Lying in bed, opens eyes to voice. No acute distress.  Eyes: Pupils equal round reactive.  Extraocular muscles appear intact.  Clear sclera.  HEENT: Moist mucous membranes. Overall difficult exam with trismus.  Left-sided oropharyngeal swelling.  Does appear some exudate on the left.  Neck -swelling and cervical adenopathy present on the left.  Neck is supple.  Good range of motion.  No stridor.  Respiratory: Comfortable respiratory effort.  No crackles or wheezing.  Cardiovascular: Regular rate rhythm.  No murmurs.  GI: Nondistended.  Bowel sounds present.  Soft nontender.  Skin: Warm and well perfused.  No visible rash on visualized skin.  Musculoskeletal: Moving all 4 extremities.  Grossly normal to " inspection.  Neurologic: Alert oriented to person place situation.  No gross focal deficits.  Psychiatric: Pleasant affect.  Logical thought process.  Good insight.    Data     I have personally reviewed the following data over the past 24 hrs:    14.1 (H)  \   13.6   / 313     137 102 11.8 /  101 (H)   3.8 25 0.81 \     Procal: N/A CRP: 54.10 (H) Lactic Acid: N/A         Imaging results reviewed over the past 24 hrs:   Recent Results (from the past 24 hour(s))   Soft tissue neck CT w contrast    Narrative    EXAM: CT SOFT TISSUE NECK W CONTRAST  LOCATION: [facility]  DATE: 11/8/2023    INDICATION: h o recent bact pharyngitis treated with abx steroids, worsening with enlarged L tonsil  COMPARISON: 11/02/2023  CONTRAST: ISOVUE 370 90 ML  TECHNIQUE: Routine CT Soft Tissue Neck with IV contrast. Multiplanar reformats. Dose reduction techniques were used.    FINDINGS:     MUCOSAL SPACES/SOFT TISSUES: Interval development of a 3.4 x 2.4 x 2.4 cm left peritonsillar abscess. Edema of the ipsilateral oropharyngeal and hypopharyngeal mucosal space. Small amount of retropharyngeal fluid eccentric left. Airway is patent.   Epiglottis is normal. Moderate narrowing of the oral pharyngeal airway. Normal vocal cords and infraglottic trachea.    LYMPH NODES: Reactive upper cervical chain lymphadenopathy.     SALIVARY GLANDS: Normal parotid glands. Fluid in the left submandibular space    THYROID: Normal.     VESSELS: Vascular structures of the neck are patent.    VISUALIZED INTRACRANIAL/ORBITS/SINUSES: No abnormality of the visualized intracranial compartment or orbits. Visualized paranasal sinuses and mastoid air cells are clear.    OTHER: No destructive osseous lesion. The included lung apices are clear.      Impression    IMPRESSION:   1.  Interval development of a 3.4 x 2.4 x 2.4 cm left peritonsillar abscess.  2.  Edema of the ipsilateral oropharyngeal and hypopharyngeal mucosal space with moderate  narrowing of the oral pharyngeal airway.  3.  Small amount of retropharyngeal fluid eccentric left.  4.  Reactive upper cervical chain lymphadenopathy.

## 2023-11-08 NOTE — CONSULTS
"Care Management Initial Consult    General Information  Assessment completed with: Tan Suacedo  Type of CM/SW Visit: Initial Assessment    Primary Care Provider verified and updated as needed: Yes   Readmission within the last 30 days: no previous admission in last 30 days      Reason for Consult: discharge planning  Advance Care Planning: Advance Care Planning Reviewed: no concerns identified          Communication Assessment  Patient's communication style: spoken language (English or Bilingual)             Cognitive  Cognitive/Neuro/Behavioral: WDL                      Living Environment:   People in home: spouse, child(deandra), dependent  Tan and wife Linese and 1 year old  Current living Arrangements: house      Able to return to prior arrangements: yes       Family/Social Support:  Care provided by: self  Provides care for: child(deandra) (1 year old)  Marital Status:   Wife  Angela       Description of Support System: Supportive, Involved    Support Assessment: Adequate social supports, Adequate family and caregiver support, Patient communicates needs well met    Current Resources:   Patient receiving home care services: No     Community Resources: None  Equipment currently used at home: none  Supplies currently used at home: None    Employment/Financial:  Employment Status: employed full-time     Employment/ Comments: \"no  history\"  Financial Concerns:     Referral to Financial Worker: No       Does the patient's insurance plan have a 3 day qualifying hospital stay waiver?  No    Lifestyle & Psychosocial Needs:  Social Determinants of Health     Food Insecurity: Not on file   Depression: Not at risk (6/30/2023)    PHQ-2     PHQ-2 Score: 2   Housing Stability: Not on file   Tobacco Use: Low Risk  (11/8/2023)    Patient History     Smoking Tobacco Use: Never     Smokeless Tobacco Use: Never     Passive Exposure: Not on file   Financial Resource Strain: Not on file   Alcohol Use: Not on file "   Transportation Needs: Not on file   Physical Activity: Not on file   Interpersonal Safety: Not on file   Stress: Not on file   Social Connections: Not on file       Functional Status:  Prior to admission patient needed assistance:   Dependent ADLs:: Independent, Ambulation-no assistive device  Dependent IADLs:: Independent  Assesssment of Functional Status: At functional baseline    Mental Health Status:          Chemical Dependency Status:                Values/Beliefs:  Spiritual, Cultural Beliefs, Advent Practices, Values that affect care:                 Additional Information:  Tan lives in a house with his wife and 1 year old. He is independent with ADLs and IADLs. He drives and works full time.    Likely home with no new needs at this time.    Family to transport at discharge.    What is Observation was given and discussed. All questions were answered.    CM to follow for medical progression of care, discharge recommendations, and final discharge plan.    Ashli Hurtado RN

## 2023-11-08 NOTE — PROGRESS NOTES
"PRIMARY DIAGNOSIS: \"GENERIC\" NURSING  OUTPATIENT/OBSERVATION GOALS TO BE MET BEFORE DISCHARGE:  ADLs back to baseline: Yes    Activity and level of assistance: Ambulating independently.    Pain status: Improved but still requiring IV narcotics.    Return to near baseline physical activity: Yes     Discharge Planner Nurse   Safe discharge environment identified: Yes  Barriers to discharge: Yes       Entered by: Swati Francisco RN 11/08/2023 1:03 PM   Patient requiring IV narcotics for pain. Up independently. Surgery scheduled at 1500.  Please review provider order for any additional goals.   Nurse to notify provider when observation goals have been met and patient is ready for discharge.  "

## 2023-11-08 NOTE — ANESTHESIA PREPROCEDURE EVALUATION
Anesthesia Pre-Procedure Evaluation    Patient: Tan Florence   MRN: [id number] : 1985        Procedure : Procedure(s):  INCISION AND DRAINAGE, ABSCESS, TONSILLAR OR PERITONSILLAR          Past Medical History:   Diagnosis Date    Gastroesophageal reflux disease     NO ACTIVE PROBLEMS 2008    Obese       Past Surgical History:   Procedure Laterality Date    ESOPHAGOSCOPY, GASTROSCOPY, DUODENOSCOPY (EGD), COMBINED N/A 2023    Procedure: ESOPHAGOGASTRODUODENOSCOPY, WITH BIOPSY;  Surgeon: Sumit David DO;  Location: [facility]    NO HISTORY OF SURGERY        No Known Allergies   Social History     Tobacco Use    Smoking status: Never    Smokeless tobacco: Never   Substance Use Topics    Alcohol use: Yes     Alcohol/week: 5.0 standard drinks of alcohol     Types: 6 drink(s) per week     Comment: couple times per week      Wt Readings from Last 1 Encounters:   23 93.9 kg (207 lb)        Anesthesia Evaluation   Pt has not had prior anesthetic         ROS/MED HX  ENT/Pulmonary:     (+) sleep apnea, moderate, doesn't use CPAP,                                     Neurologic:       Cardiovascular:       METS/Exercise Tolerance:     Hematologic:       Musculoskeletal:       GI/Hepatic:     (+) GERD,                   Renal/Genitourinary:       Endo:       Psychiatric/Substance Use:       Infectious Disease:       Malignancy:       Other:            Physical Exam    Airway  airway exam normal      Mallampati: II       Respiratory Devices and Support         Dental  no notable dental history         Cardiovascular   cardiovascular exam normal       Rhythm and rate: regular and normal     Pulmonary   pulmonary exam normal        breath sounds clear to auscultation           OUTSIDE LABS:  CBC:   Lab Results   Component Value Date    WBC 14.1 (H) 2023    HGB 13.6 2023    HCT 40.0 2023     2023     BMP:   Lab Results   Component Value Date      "11/08/2023    POTASSIUM 3.8 11/08/2023    CHLORIDE 102 11/08/2023    CO2 25 11/08/2023    BUN 11.8 11/08/2023    CR 0.81 11/08/2023     (H) 11/08/2023     COAGS: No results found for: \"PTT\", \"INR\", \"FIBR\"  POC: No results found for: \"BGM\", \"HCG\", \"HCGS\"  HEPATIC: No results found for: \"ALBUMIN\", \"PROTTOTAL\", \"ALT\", \"AST\", \"GGT\", \"ALKPHOS\", \"BILITOTAL\", \"BILIDIRECT\", \"ALISIA\"  OTHER:   Lab Results   Component Value Date    BENJIE 9.6 11/08/2023       Anesthesia Plan    ASA Status:  2       Anesthesia Type: General.     - Airway: ETT   Induction: Intravenous, Propofol.   Maintenance: TIVA.   Techniques and Equipment:     - Airway: Video-Laryngoscope       Consents    Anesthesia Plan(s) and associated risks, benefits, and realistic alternatives discussed. Questions answered and patient/representative(s) expressed understanding.     - Discussed:     - Discussed with:  Patient, Spouse      - Extended Intubation/Ventilatory Support Discussed: No.      - Patient is DNR/DNI Status: No     Use of blood products discussed: No .     Postoperative Care    Pain management: IV analgesics, Oral pain medications.   PONV prophylaxis: Ondansetron (or other 5HT-3), Dexamethasone or Solumedrol     Comments:    Other Comments: Gentle intubation with glidescope (avoid abscess)            Uzair Shetty MD  "

## 2023-11-09 VITALS
TEMPERATURE: 97.8 F | HEART RATE: 71 BPM | RESPIRATION RATE: 18 BRPM | SYSTOLIC BLOOD PRESSURE: 134 MMHG | WEIGHT: 207 LBS | HEIGHT: 70 IN | OXYGEN SATURATION: 97 % | DIASTOLIC BLOOD PRESSURE: 86 MMHG | BODY MASS INDEX: 29.63 KG/M2

## 2023-11-09 LAB
ANION GAP SERPL CALCULATED.3IONS-SCNC: 9 MMOL/L (ref 7–15)
BUN SERPL-MCNC: 12.3 MG/DL (ref 6–20)
CALCIUM SERPL-MCNC: 8.3 MG/DL (ref 8.6–10)
CHLORIDE SERPL-SCNC: 105 MMOL/L (ref 98–107)
CREAT SERPL-MCNC: 0.82 MG/DL (ref 0.67–1.17)
DEPRECATED HCO3 PLAS-SCNC: 26 MMOL/L (ref 22–29)
EGFRCR SERPLBLD CKD-EPI 2021: >90 ML/MIN/1.73M2
ERYTHROCYTE [DISTWIDTH] IN BLOOD BY AUTOMATED COUNT: 12.6 % (ref 10–15)
GLUCOSE SERPL-MCNC: 101 MG/DL (ref 70–99)
HCT VFR BLD AUTO: 36.9 % (ref 40–53)
HGB BLD-MCNC: 12.3 G/DL (ref 13.3–17.7)
MCH RBC QN AUTO: 28.3 PG (ref 26.5–33)
MCHC RBC AUTO-ENTMCNC: 33.3 G/DL (ref 31.5–36.5)
MCV RBC AUTO: 85 FL (ref 78–100)
PLATELET # BLD AUTO: 308 10E3/UL (ref 150–450)
POTASSIUM SERPL-SCNC: 4.3 MMOL/L (ref 3.4–5.3)
RBC # BLD AUTO: 4.34 10E6/UL (ref 4.4–5.9)
SODIUM SERPL-SCNC: 140 MMOL/L (ref 135–145)
WBC # BLD AUTO: 14 10E3/UL (ref 4–11)

## 2023-11-09 PROCEDURE — 36415 COLL VENOUS BLD VENIPUNCTURE: CPT | Performed by: OTOLARYNGOLOGY

## 2023-11-09 PROCEDURE — 250N000009 HC RX 250

## 2023-11-09 PROCEDURE — 250N000011 HC RX IP 250 OP 636: Mod: JZ | Performed by: OTOLARYNGOLOGY

## 2023-11-09 PROCEDURE — 99238 HOSP IP/OBS DSCHRG MGMT 30/<: CPT | Mod: GC

## 2023-11-09 PROCEDURE — 250N000013 HC RX MED GY IP 250 OP 250 PS 637

## 2023-11-09 PROCEDURE — 258N000003 HC RX IP 258 OP 636: Performed by: OTOLARYNGOLOGY

## 2023-11-09 PROCEDURE — 80048 BASIC METABOLIC PNL TOTAL CA: CPT | Performed by: OTOLARYNGOLOGY

## 2023-11-09 PROCEDURE — 85027 COMPLETE CBC AUTOMATED: CPT | Performed by: OTOLARYNGOLOGY

## 2023-11-09 RX ORDER — LIDOCAINE HYDROCHLORIDE 20 MG/ML
5 SOLUTION OROPHARYNGEAL
Qty: 20 ML | Refills: 1 | Status: SHIPPED | OUTPATIENT
Start: 2023-11-09 | End: 2023-12-07

## 2023-11-09 RX ORDER — METHYLPREDNISOLONE 4 MG
TABLET, DOSE PACK ORAL
Qty: 21 TABLET | Refills: 0 | Status: SHIPPED | OUTPATIENT
Start: 2023-11-09 | End: 2023-12-07

## 2023-11-09 RX ORDER — LIDOCAINE HYDROCHLORIDE 20 MG/ML
5 SOLUTION OROPHARYNGEAL
Status: DISCONTINUED | OUTPATIENT
Start: 2023-11-09 | End: 2023-11-09 | Stop reason: HOSPADM

## 2023-11-09 RX ADMIN — PIPERACILLIN AND TAZOBACTAM 3.38 G: 3; .375 INJECTION, POWDER, FOR SOLUTION INTRAVENOUS at 00:36

## 2023-11-09 RX ADMIN — ACETAMINOPHEN 650 MG: 650 SOLUTION ORAL at 09:20

## 2023-11-09 RX ADMIN — SODIUM CHLORIDE: 9 INJECTION, SOLUTION INTRAVENOUS at 04:09

## 2023-11-09 RX ADMIN — PIPERACILLIN AND TAZOBACTAM 3.38 G: 3; .375 INJECTION, POWDER, FOR SOLUTION INTRAVENOUS at 09:10

## 2023-11-09 RX ADMIN — LIDOCAINE HYDROCHLORIDE 5 ML: 20 SOLUTION ORAL; TOPICAL at 13:49

## 2023-11-09 ASSESSMENT — ACTIVITIES OF DAILY LIVING (ADL)
ADLS_ACUITY_SCORE: 18

## 2023-11-09 NOTE — SIGNIFICANT EVENT
Upon nursing assessment this morning, patient did endorse chest pain, rating it 2-3/10. He stated it started as soon as he woke up which he noted was a little bit before 7 and described it as dull sensation. Patient pointed to his left chest around his heart as the location of the pain. Denied it radiating down his left arm, or in the epigastric area.  Vitals signs were done. House Officer was paged.. Dr Cifuentes called back and stated would let Dr. Villegas aware of this as he is rounding on patient today.     As of 0920 upon reassessment patient did say chest pain is subsiding.

## 2023-11-09 NOTE — DISCHARGE SUMMARY
"[facility]  Hospitalist Discharge Summary      Date of Admission:  11/7/2023  Date of Discharge:  11/9/2023  Discharging Provider: Brice Villegas MD  Discharge Service: Hospitalist Service    Discharge Diagnoses   Left-sided peritonsillar abscess  Leukocytosis, improved    Clinically Significant Risk Factors     # Overweight: Estimated body mass index is 29.7 kg/m  as calculated from the following:    Height as of this encounter: 1.778 m (5' 10\").    Weight as of this encounter: 93.9 kg (207 lb).       Follow-ups Needed After Discharge   Follow-up Appointments     Follow-up and recommended labs and tests       Follow up with primary care provider, Alice De Anda, within 7 days   for hospital follow- up.  No follow up labs or test are needed.            Unresulted Labs Ordered in the Past 30 Days of this Admission       Date and Time Order Name Status Description    11/8/2023  3:51 PM Abscess Aerobic Bacterial Culture Routine In process     11/8/2023  3:51 PM Anaerobic Bacterial Culture Routine In process         These results will be followed up by Alice De Anda    Discharge Disposition   Discharged to home  Condition at discharge: Stable    Hospital Course   Tan Florence is a 38 year old male admitted on 11/7/2023. He has a past medical history significant for GERD and chronic paranasal sinus congestion. Admitted for left-sided peritonsillar abscess.    Left-sided peritonsillar abscess  Leukocytosis, improved  Presented with progressive sore throat, trismus and subjective intermittent upper airway obstruction. CT demonstrating 3.4 x 2.4 x 2.4 cm left sided peritonsillar abscess with evidence of ipsilateral oropharyngeal and hypopharyngeal edema and moderate narrowing of the oropharyngeal airway. Recently negative for both group A strep and Jeovanny-Barr virus. No airway compromise while here, ENT consulted who took pt for I&D in the OR 11/8. Drained w/o complication, " wound culture grew out gram positive cocci within 12 hours. Discharged home on steroid taper and 10d course of Augmentin. Also sending script for viscous lidocaine as ENT placed a suture over puncture site and pt reports feeling this rubbing against his throat.    Consultations This Hospital Stay   ENT IP CONSULT  CARE MANAGEMENT / SOCIAL WORK IP CONSULT    Code Status   Full Code    Brice Villegas MD  [address] [address]  Phone: [phone]  Fax: [phone]  ______________________________________________________________________    Physical Exam   Vital Signs: Temp: 97.8  F (36.6  C) Temp src: Oral BP: 134/86 Pulse: 71   Resp: 18 SpO2: 97 % O2 Device: None (Room air) Oxygen Delivery: 1 LPM  Weight: 207 lbs 0 oz    General Appearance: Comfortable. No acute distress.  Eyes: Pupils equal round reactive.  Extraocular muscles appear intact.  Clear sclera.  HEENT: Moist mucous membranes. Left-sided oropharyngeal swelling  Neck -swelling and cervical adenopathy present on the left.  Neck is supple.  Good range of motion.  No stridor.  Respiratory: Comfortable respiratory effort.  No crackles or wheezing.  Cardiovascular: Regular rate rhythm.  No murmurs.  GI: Nondistended.  Bowel sounds present.  Soft nontender.  Skin: Warm and well perfused.  No visible rash on visualized skin.  Musculoskeletal: Moving all 4 extremities.  Grossly normal to inspection.  Neurologic: Alert oriented to person place situation.  No gross focal deficits.  Psychiatric: Pleasant affect.  Logical thought process.  Good insight.       Primary Care Physician   Alice De Anda    Discharge Orders      Reason for your hospital stay    Admitted for L sided peritonsillar abscess     Follow-up and recommended labs and tests     Follow up with primary care provider, Alice De Anda, within 7 days for hospital follow- up.  No follow up labs or test are needed.     Activity    Your  activity upon discharge: activity as tolerated     Diet    Follow this diet upon discharge: Orders Placed This Encounter      Mechanical/Dental Soft Diet     Significant Results and Procedures   Most Recent 3 CBC's:  Recent Labs   Lab Test 11/09/23  0640 11/08/23  0041   WBC 14.0* 14.1*   HGB 12.3* 13.6   MCV 85 85    313   ,   Results for orders placed or performed during the hospital encounter of 11/07/23   Soft tissue neck CT w contrast    Narrative    EXAM: CT SOFT TISSUE NECK W CONTRAST  LOCATION: [facility]  DATE: 11/8/2023    INDICATION: h o recent bact pharyngitis treated with abx steroids, worsening with enlarged L tonsil  COMPARISON: 11/02/2023  CONTRAST: ISOVUE 370 90 ML  TECHNIQUE: Routine CT Soft Tissue Neck with IV contrast. Multiplanar reformats. Dose reduction techniques were used.    FINDINGS:     MUCOSAL SPACES/SOFT TISSUES: Interval development of a 3.4 x 2.4 x 2.4 cm left peritonsillar abscess. Edema of the ipsilateral oropharyngeal and hypopharyngeal mucosal space. Small amount of retropharyngeal fluid eccentric left. Airway is patent.   Epiglottis is normal. Moderate narrowing of the oral pharyngeal airway. Normal vocal cords and infraglottic trachea.    LYMPH NODES: Reactive upper cervical chain lymphadenopathy.     SALIVARY GLANDS: Normal parotid glands. Fluid in the left submandibular space    THYROID: Normal.     VESSELS: Vascular structures of the neck are patent.    VISUALIZED INTRACRANIAL/ORBITS/SINUSES: No abnormality of the visualized intracranial compartment or orbits. Visualized paranasal sinuses and mastoid air cells are clear.    OTHER: No destructive osseous lesion. The included lung apices are clear.      Impression    IMPRESSION:   1.  Interval development of a 3.4 x 2.4 x 2.4 cm left peritonsillar abscess.  2.  Edema of the ipsilateral oropharyngeal and hypopharyngeal mucosal space with moderate narrowing of the oral pharyngeal airway.  3.  Small  amount of retropharyngeal fluid eccentric left.  4.  Reactive upper cervical chain lymphadenopathy.         Discharge Medications   Current Discharge Medication List        START taking these medications    Details   amoxicillin-clavulanate (AUGMENTIN) 875-125 MG tablet Take 1 tablet by mouth 2 times daily for 10 days  Qty: 20 tablet, Refills: 0    Associated Diagnoses: Peritonsillar abscess      lidocaine, viscous, (XYLOCAINE) 2 % solution Take 5 mLs by mouth every 2 hours as needed ; Max 8 doses/24 hour period.  Qty: 20 mL, Refills: 1    Associated Diagnoses: Peritonsillar abscess      methylPREDNISolone (MEDROL DOSEPAK) 4 MG tablet therapy pack Follow Package Directions  Qty: 21 tablet, Refills: 0    Associated Diagnoses: Peritonsillar abscess           CONTINUE these medications which have NOT CHANGED    Details   acetaminophen (TYLENOL) 500 MG tablet Take 500-1,000 mg by mouth every 6 hours as needed for mild pain      azelastine (ASTELIN) 0.1 % nasal spray Spray 1 spray into both nostrils 2 times daily  Qty: 30 mL, Refills: 0    Associated Diagnoses: Chronic congestion of paranasal sinus      ibuprofen (ADVIL/MOTRIN) 200 MG tablet Take 200 mg by mouth every 4 hours as needed for pain           STOP taking these medications       clindamycin (CLEOCIN) 300 MG capsule Comments:   Reason for Stopping:             Allergies   No Known Allergies

## 2023-11-09 NOTE — PLAN OF CARE
Discharge information gone over with patient in the room. Patient is to  his prescriptions at his [facility] pharmacy. Patient aware. Patient ambulated to the from entrance of the hospital.

## 2023-11-09 NOTE — PROGRESS NOTES
Care Management Discharge Note    Discharge Date: 11/09/2023       Discharge Disposition: Home    Discharge Services: None    Discharge DME:  NA    Discharge Transportation: family or friend will provide    Private pay costs discussed: Not applicable    Does the patient's insurance plan have a 3 day qualifying hospital stay waiver?  No    PAS Confirmation Code: NA  Patient/family educated on Medicare website which has current facility and service quality ratings:      Education Provided on the Discharge Plan: Yes (per care team)  Persons Notified of Discharge Plans: per care team  Patient/Family in Agreement with the Plan: yes    Handoff Referral Completed: Yes    Additional Information:  Patient discharging home with family.  No Cm needs identified.     Mirella Graham RN

## 2023-11-09 NOTE — PLAN OF CARE
Problem: Skin or Soft Tissue Infection  Goal: Absence of Infection Signs and Symptoms  Outcome: Progressing     Problem: Pain Acute  Goal: Optimal Pain Control and Function  Outcome: Progressing   Goal Outcome Evaluation:    PRN Tylenol given for pain. IV zosyn running. Has been afebrile. Ambulating in hallways.

## 2023-11-09 NOTE — ANESTHESIA POSTPROCEDURE EVALUATION
Patient: Tan Florence    Procedure: Procedure(s):  INCISION AND DRAINAGE, ABSCESS, LEFT TONSILLAR OR PERITONSILLAR       Anesthesia Type:  General    Note:  Disposition: Inpatient   Postop Pain Control: Uneventful            Sign Out: Well controlled pain   PONV: No   Neuro/Psych: Uneventful            Sign Out: Acceptable/Baseline neuro status   Airway/Respiratory: Uneventful            Sign Out: Acceptable/Baseline resp. status   CV/Hemodynamics: Uneventful            Sign Out: Acceptable CV status; No obvious hypovolemia; No obvious fluid overload   Other NRE: NONE   DID A NON-ROUTINE EVENT OCCUR? No           Last vitals:  Vitals Value Taken Time   /90 11/08/23 1726   Temp 36.7  C (98.1  F) 11/08/23 1726   Pulse 75 11/08/23 1726   Resp 17 11/08/23 1715   SpO2 99 % 11/08/23 1726       Electronically Signed By: Shanta Sierra MD  November 8, 2023  8:59 PM

## 2023-11-09 NOTE — PROGRESS NOTES
"Tan Florence is a 38 year old male patient POD #1 from I and D of LEFT peritonsillar abscess. No events overnight.  He is tolerating a clear liquid diet.         1. Peritonsillar abscess      Past Medical History:   Diagnosis Date    Gastroesophageal reflux disease     NO ACTIVE PROBLEMS 06/16/2008    Obese      No current outpatient medications on file.     No Known Allergies  Principal Problem:    Peritonsillar abscess    Blood pressure (!) 141/95, pulse 68, temperature 98.3  F (36.8  C), temperature source Oral, resp. rate 18, height 1.778 m (5' 10\"), weight 93.9 kg (207 lb), SpO2 95%.    Subjective  Pain = 5/10  Objective     OC/OP:  Uvula midline; suture in placed left tonsillar pillar; scant tonsillar exudate    Culture:  3+ gram positive cocci; 4+ WBC      Assessment & Plan    POD # 1 from I and D of left peritonsillar abscess.  Advance to soft diet  Patient can be discharge once if pain is established  Recommend discharge on medrol dosepack and 10 day course of augmentin.  F/U with ENT PRN basis.       Harris Dixon MD  11/9/2023      "

## 2023-11-09 NOTE — PLAN OF CARE
Goal Outcome Evaluation:    Slept well. Denies pain. IVF and abx infusing. Independent in room.     Temp: 97.9  F (36.6  C) Temp src: Oral BP: 128/81 Pulse: 75   Resp: 16 SpO2: 98 % O2 Device: None (Room air) Oxygen Delivery: 1 LPM

## 2023-11-11 ENCOUNTER — PATIENT OUTREACH (OUTPATIENT)
Dept: CARE COORDINATION | Facility: CLINIC | Age: 38
End: 2023-11-11
Payer: COMMERCIAL

## 2023-11-11 LAB — BACTERIA ABSC ANAEROBE+AEROBE CULT: ABNORMAL

## 2023-11-11 NOTE — PROGRESS NOTES
Connected Care Resource Center Contact  [facility]/Voicemail     Clinical Data: Post-Discharge Outreach     Outreach attempted x 2.  Left message on patient's voicemail, providing [facility]'s central phone number of [phone] ([phone]) for questions/concerns and/or to schedule an appt with an [facility] provider, if they do not have a PCP.      Plan:  [facility] will do no further outreaches at this time.       Katelyn Rivera RN  Connected Care Resource Center, [facility]    *Connected Care Resource Team does NOT follow patient ongoing. Referrals are identified based on internal discharge reports and the outreach is to ensure patient has an understanding of their discharge instructions.

## 2023-11-15 LAB
BACTERIA ABSC ANAEROBE+AEROBE CULT: ABNORMAL
BACTERIA ABSC ANAEROBE+AEROBE CULT: ABNORMAL

## 2023-12-07 ENCOUNTER — OFFICE VISIT (OUTPATIENT)
Dept: FAMILY MEDICINE | Facility: CLINIC | Age: 38
End: 2023-12-07
Payer: COMMERCIAL

## 2023-12-07 VITALS
BODY MASS INDEX: 29.84 KG/M2 | HEART RATE: 55 BPM | TEMPERATURE: 97.5 F | DIASTOLIC BLOOD PRESSURE: 70 MMHG | SYSTOLIC BLOOD PRESSURE: 128 MMHG | WEIGHT: 208 LBS | OXYGEN SATURATION: 98 %

## 2023-12-07 DIAGNOSIS — Z13.6 CARDIOVASCULAR SCREENING; LDL GOAL LESS THAN 160: ICD-10-CM

## 2023-12-07 DIAGNOSIS — Z11.4 SCREENING FOR HIV (HUMAN IMMUNODEFICIENCY VIRUS): ICD-10-CM

## 2023-12-07 DIAGNOSIS — Z11.59 NEED FOR HEPATITIS C SCREENING TEST: ICD-10-CM

## 2023-12-07 DIAGNOSIS — J36 PERITONSILLAR ABSCESS: Primary | ICD-10-CM

## 2023-12-07 LAB
CHOLEST SERPL-MCNC: 268 MG/DL
FASTING STATUS PATIENT QL REPORTED: NO
FASTING STATUS PATIENT QL REPORTED: NO
GLUCOSE SERPL-MCNC: 90 MG/DL (ref 70–99)
HDLC SERPL-MCNC: 47 MG/DL
LDLC SERPL CALC-MCNC: 174 MG/DL
NONHDLC SERPL-MCNC: 221 MG/DL
TRIGL SERPL-MCNC: 234 MG/DL

## 2023-12-07 PROCEDURE — 80061 LIPID PANEL: CPT | Performed by: PHYSICIAN ASSISTANT

## 2023-12-07 PROCEDURE — 99212 OFFICE O/P EST SF 10 MIN: CPT | Performed by: PHYSICIAN ASSISTANT

## 2023-12-07 PROCEDURE — 86803 HEPATITIS C AB TEST: CPT | Performed by: PHYSICIAN ASSISTANT

## 2023-12-07 PROCEDURE — 36415 COLL VENOUS BLD VENIPUNCTURE: CPT | Performed by: PHYSICIAN ASSISTANT

## 2023-12-07 PROCEDURE — 82947 ASSAY GLUCOSE BLOOD QUANT: CPT | Performed by: PHYSICIAN ASSISTANT

## 2023-12-07 PROCEDURE — 87389 HIV-1 AG W/HIV-1&-2 AB AG IA: CPT | Performed by: PHYSICIAN ASSISTANT

## 2023-12-07 NOTE — PROGRESS NOTES
"  Assessment & Plan       ICD-10-CM    1. Peritonsillar abscess  J36       2. CARDIOVASCULAR SCREENING; LDL GOAL LESS THAN 160  Z13.6 Lipid panel reflex to direct LDL Non-fasting     Glucose     Lipid panel reflex to direct LDL Non-fasting     Glucose      3. Screening for HIV (human immunodeficiency virus)  Z11.4 HIV Antigen Antibody Combo     HIV Antigen Antibody Combo      4. Need for hepatitis C screening test  Z11.59 Hepatitis C Screen Reflex to HCV RNA Quant and Genotype     Hepatitis C Screen Reflex to HCV RNA Quant and Genotype          Well healed/resolved issue  Given he is in clinic and otherwise does not have to come in that often, will get lipid panel/cardiovascular screening  Follow up as needed           MED REC REQUIRED  Post Medication Reconciliation Status: discharge medications reconciled, continue medications without change  BMI:   Estimated body mass index is 29.84 kg/m  as calculated from the following:    Height as of 11/7/23: 1.778 m (5' 10\").    Weight as of this encounter: 94.3 kg (208 lb).           Alice De Anda PA-C  [facility] KENYON Maddox is a 38 year old, presenting for the following health issues:  Hospital F/U        12/7/2023    10:43 AM   Additional Questions   Roomed by LEANNA Merchant       [facility]         Hospital Follow-up Visit:    Hospital/Nursing Home/IP Rehab Facility: [facility]  Date of Admission: 11/7/2023  Date of Discharge: 11/9/2023  Reason(s) for Admission: Peritonsillar abscess     -He said he thinks it is pretty much healed.    Was your hospitalization related to COVID-19? No   Problems taking medications regularly:  None  Medication changes since discharge: None  Problems adhering to non-medication therapy:  None - completed course of steroids and antibiotics as prescribed    Summary of hospitalization:  [facility] discharge summary reviewed  Diagnostic Tests/Treatments reviewed.  Follow up needed: " none  Other Healthcare Providers Involved in Patient s Care:         None  Update since discharge: improved.     Plan of care communicated with patient     Started with URI and sore throat  Sore throat got progressively worse  Went to UC and then ED within 2 days of each other (10/30 and 11/2)  CT on 11/2 did not show abscess but was put on clindamycin and prednisone  Stayed about the same but once the prednisone was done the pain increased and he noticed issues swallowing   Admitted 11/7  Repeat CT demonstrated an abscess   Brought to OR by ENT on 11/8 for I&D  Discharged on steroid taper and augmentin    Said by Thanksgiving he finally felt things were 'back to normal'   Today states diet is normal - no pain or ongoing issues      Review of Systems   Remainder of ROS obtained and found to be negative other than that which was documented above        Objective    /70   Pulse 55   Temp 97.5  F (36.4  C) (Tympanic)   Wt 94.3 kg (208 lb)   SpO2 98%   BMI 29.84 kg/m    Body mass index is 29.84 kg/m .  Physical Exam   GENERAL: healthy, alert and no distress  HENT: ear canals and TM's normal, nose and mouth without ulcers or lesions  MOUTH: no erythema or edema noted. Normal airway. Uvula midline  NECK: no adenopathy, no asymmetry, masses, or scars and thyroid normal to palpation  RESP: lungs clear to auscultation - no rales, rhonchi or wheezes  CV: regular rates and rhythm, normal S1 S2, no S3 or S4, and no murmur, click or rub

## 2023-12-08 LAB
HCV AB SERPL QL IA: NONREACTIVE
HIV 1+2 AB+HIV1 P24 AG SERPL QL IA: NONREACTIVE

## 2024-02-14 ENCOUNTER — OFFICE VISIT (OUTPATIENT)
Dept: FAMILY MEDICINE | Facility: CLINIC | Age: 39
End: 2024-02-14
Payer: COMMERCIAL

## 2024-02-14 ENCOUNTER — NURSE TRIAGE (OUTPATIENT)
Dept: FAMILY MEDICINE | Facility: CLINIC | Age: 39
End: 2024-02-14

## 2024-02-14 ENCOUNTER — MYC MEDICAL ADVICE (OUTPATIENT)
Dept: FAMILY MEDICINE | Facility: CLINIC | Age: 39
End: 2024-02-14

## 2024-02-14 VITALS
SYSTOLIC BLOOD PRESSURE: 143 MMHG | DIASTOLIC BLOOD PRESSURE: 83 MMHG | HEART RATE: 88 BPM | HEIGHT: 70 IN | WEIGHT: 212.4 LBS | TEMPERATURE: 97.4 F | OXYGEN SATURATION: 98 % | BODY MASS INDEX: 30.41 KG/M2

## 2024-02-14 DIAGNOSIS — R07.0 THROAT PAIN: Primary | ICD-10-CM

## 2024-02-14 DIAGNOSIS — G47.33 OSA (OBSTRUCTIVE SLEEP APNEA): ICD-10-CM

## 2024-02-14 DIAGNOSIS — J36 PERITONSILLAR ABSCESS: ICD-10-CM

## 2024-02-14 LAB — DEPRECATED S PYO AG THROAT QL EIA: NEGATIVE

## 2024-02-14 PROCEDURE — 87651 STREP A DNA AMP PROBE: CPT | Performed by: FAMILY MEDICINE

## 2024-02-14 PROCEDURE — 99213 OFFICE O/P EST LOW 20 MIN: CPT | Performed by: FAMILY MEDICINE

## 2024-02-14 NOTE — TELEPHONE ENCOUNTER
See Triage encounter dated today. Patient scheduled apt this afternoon with .     Keren Hinson RN

## 2024-02-14 NOTE — TELEPHONE ENCOUNTER
Call placed to patient   No answer; voicemail left requesting call back to Clinic RN at [phone]    Joey Burton, Clinic RN  [facility]

## 2024-02-14 NOTE — TELEPHONE ENCOUNTER
"  Scheduled with  today for sore throat and history of peritonsillar abscess       Reason for Disposition   Patient wants to be seen    Additional Information   Negative: SEVERE difficulty breathing (e.g., struggling for each breath, speaks in single words)   Negative: Sounds like a life-threatening emergency to the triager   Negative: Throat culture results, call about   Negative: Productive cough is main symptom   Negative: Runny nose is main symptom   Negative: Drooling or spitting out saliva (because can't swallow)   Negative: Unable to open mouth completely   Negative: Drinking very little and has signs of dehydration (e.g., no urine > 12 hours, very dry mouth, very lightheaded)   Negative: Patient sounds very sick or weak to the triager   Negative: Difficulty breathing (per caller) but not severe   Negative: Fever > 103 F (39.4 C)   Negative: Refuses to drink anything for > 12 hours    Answer Assessment - Initial Assessment Questions  1. ONSET: \"When did the throat start hurting?\" (Hours or days ago)       Last night  2. SEVERITY: \"How bad is the sore throat?\" (Scale 1-10; mild, moderate or severe)    - MILD (1-3):  Doesn't interfere with eating or normal activities.    - MODERATE (4-7): Interferes with eating some solids and normal activities.    - SEVERE (8-10):  Excruciating pain, interferes with most normal activities.    - SEVERE WITH DYSPHAGIA (10): Can't swallow liquids, drooling.      Moderate   3. STREP EXPOSURE: \"Has there been any exposure to strep within the past week?\" If Yes, ask: \"What type of contact occurred?\"       No  4.  VIRAL SYMPTOMS: \"Are there any symptoms of a cold, such as a runny nose, cough, hoarse voice or red eyes?\"       Occasional cough  5. FEVER: \"Do you have a fever?\" If Yes, ask: \"What is your temperature, how was it measured, and when did it start?\"      Not measured but has had some sweats/chills   6. PUS ON THE TONSILS: \"Is there pus on the tonsils in the back " "of your throat?\"      Unable to see   7. OTHER SYMPTOMS: \"Do you have any other symptoms?\" (e.g., difficulty breathing, headache, rash)      Pain to Right side of throat and some pain with swallowing  8. PREGNANCY: \"Is there any chance you are pregnant?\" \"When was your last menstrual period?\"      No    Protocols used: Sore Throat-A-OH    "

## 2024-02-14 NOTE — PROGRESS NOTES
"  Assessment & Plan     Throat pain    - Streptococcus A Rapid Screen w/Reflex to PCR - Clinic Collect  - Adult ENT  Referral; Future  - Group A Streptococcus PCR Throat Swab    H/o Peritonsillar abscess  No sign of abscess at this time.  If it getsd worse to ent.   - Adult ENT  Referral; Future    BEULAH (obstructive sleep apnea)  - Adult Sleep Eval & Management  Referral; Future      Elev htn  likel resated to acute infection  monitor.           BMI  Estimated body mass index is 30.48 kg/m  as calculated from the following:    Height as of this encounter: 1.778 m (5' 10\").    Weight as of this encounter: 96.3 kg (212 lb 6.4 oz).   Weight management plan: Discussed healthy diet and exercise guidelines          Paola Maddox is a 38 year old, presenting for the following health issues:  No chief complaint on file.        2/14/2024     1:38 PM   Additional Questions   Roomed by Kristen Huerta CMA     History of Present Illness       Reason for visit:  Pain in throat and neck    He eats 2-3 servings of fruits and vegetables daily.He consumes 0 sweetened beverage(s) daily.He exercises with enough effort to increase his heart rate 10 to 19 minutes per day.  He exercises with enough effort to increase his heart rate 4 days per week.   He is taking medications regularly.         Acute Illness  Acute illness concerns: throat pain  Onset/Duration: 1-2 days  Symptoms:  Fever: No  Chills/Sweats: YES- two night ago  Headache (location?): No  Sinus Pressure: No  Conjunctivitis:  No  Ear Pain: no  Rhinorrhea: No  Congestion: YES- more in his throat  Sore Throat: YES  Cough: YES-non-productive  Wheeze: No  Decreased Appetite: no has been more hungry  Nausea: YES yesterday  Vomiting: No  Diarrhea: YES  Dysuria/Freq.: No  Dysuria or Hematuria: No  Fatigue/Achiness: No  Sick/Strep Exposure: No  Therapies tried and outcome: None        Review of Systems  Constitutional, HEENT, cardiovascular, pulmonary, gi and " "gu systems are negative, except as otherwise noted.      Objective    BP (!) 143/83   Pulse 88   Temp 97.4  F (36.3  C) (Tympanic)   Ht 1.778 m (5' 10\")   Wt 96.3 kg (212 lb 6.4 oz)   SpO2 98%   BMI 30.48 kg/m    Body mass index is 30.48 kg/m .  Physical Exam   GENERAL: alert and no distress  NECK: no adenopathy, no asymmetry, masses, or scars  RESP: lungs clear to auscultation - no rales, rhonchi or wheezes  CV: regular rate and rhythm, normal S1 S2, no S3 or S4, no murmur, click or rub, no peripheral edema  ABDOMEN: soft, nontender, no hepatosplenomegaly, no masses and bowel sounds normal  MS: no gross musculoskeletal defects noted, no edema            Signed Electronically by: Andrés Velez MD    "

## 2024-02-15 LAB — GROUP A STREP BY PCR: NOT DETECTED

## 2024-10-01 ENCOUNTER — TRANSFERRED RECORDS (OUTPATIENT)
Dept: HEALTH INFORMATION MANAGEMENT | Facility: CLINIC | Age: 39
End: 2024-10-01
Payer: COMMERCIAL

## 2024-10-03 ENCOUNTER — OFFICE VISIT (OUTPATIENT)
Dept: FAMILY MEDICINE | Facility: CLINIC | Age: 39
End: 2024-10-03
Payer: COMMERCIAL

## 2024-10-03 VITALS
WEIGHT: 221 LBS | DIASTOLIC BLOOD PRESSURE: 82 MMHG | BODY MASS INDEX: 31.64 KG/M2 | HEART RATE: 65 BPM | OXYGEN SATURATION: 99 % | SYSTOLIC BLOOD PRESSURE: 136 MMHG | TEMPERATURE: 97 F | HEIGHT: 70 IN

## 2024-10-03 DIAGNOSIS — Q38.3 TONGUE ABNORMALITY: ICD-10-CM

## 2024-10-03 DIAGNOSIS — Z00.00 ROUTINE GENERAL MEDICAL EXAMINATION AT A HEALTH CARE FACILITY: Primary | ICD-10-CM

## 2024-10-03 DIAGNOSIS — Z13.6 CARDIOVASCULAR SCREENING; LDL GOAL LESS THAN 160: ICD-10-CM

## 2024-10-03 LAB
CHOLEST SERPL-MCNC: 291 MG/DL
FASTING STATUS PATIENT QL REPORTED: YES
FASTING STATUS PATIENT QL REPORTED: YES
GLUCOSE SERPL-MCNC: 96 MG/DL (ref 70–99)
HDLC SERPL-MCNC: 40 MG/DL
LDLC SERPL CALC-MCNC: 204 MG/DL
NONHDLC SERPL-MCNC: 251 MG/DL
TRIGL SERPL-MCNC: 236 MG/DL

## 2024-10-03 PROCEDURE — 90656 IIV3 VACC NO PRSV 0.5 ML IM: CPT | Performed by: PHYSICIAN ASSISTANT

## 2024-10-03 PROCEDURE — 99396 PREV VISIT EST AGE 40-64: CPT | Mod: 25 | Performed by: PHYSICIAN ASSISTANT

## 2024-10-03 PROCEDURE — 90480 ADMN SARSCOV2 VAC 1/ONLY CMP: CPT | Performed by: PHYSICIAN ASSISTANT

## 2024-10-03 PROCEDURE — 80061 LIPID PANEL: CPT | Performed by: PHYSICIAN ASSISTANT

## 2024-10-03 PROCEDURE — 91320 SARSCV2 VAC 30MCG TRS-SUC IM: CPT | Performed by: PHYSICIAN ASSISTANT

## 2024-10-03 PROCEDURE — 82947 ASSAY GLUCOSE BLOOD QUANT: CPT | Performed by: PHYSICIAN ASSISTANT

## 2024-10-03 PROCEDURE — 90471 IMMUNIZATION ADMIN: CPT | Performed by: PHYSICIAN ASSISTANT

## 2024-10-03 PROCEDURE — 36415 COLL VENOUS BLD VENIPUNCTURE: CPT | Performed by: PHYSICIAN ASSISTANT

## 2024-10-03 SDOH — HEALTH STABILITY: PHYSICAL HEALTH: ON AVERAGE, HOW MANY DAYS PER WEEK DO YOU ENGAGE IN MODERATE TO STRENUOUS EXERCISE (LIKE A BRISK WALK)?: 4 DAYS

## 2024-10-03 ASSESSMENT — SOCIAL DETERMINANTS OF HEALTH (SDOH): HOW OFTEN DO YOU GET TOGETHER WITH FRIENDS OR RELATIVES?: ONCE A WEEK

## 2024-10-03 NOTE — PROGRESS NOTES
"Preventive Care Visit  [facility] KENYON De Anda PA-C, Family Medicine  Oct 3, 2024      Assessment & Plan       ICD-10-CM    1. Routine general medical examination at a health care facility  Z00.00 Lipid panel reflex to direct LDL Fasting     Glucose      2. CARDIOVASCULAR SCREENING; LDL GOAL LESS THAN 160  Z13.6 Lipid panel reflex to direct LDL Fasting     Glucose      3. Tongue abnormality  Q38.3         Tongue with a few bumps very posteriorly. No change in taste, not bothersome to patient. Maybe appeared around the time his daughter was sick - he never really got sick like she did but possibly a change from that? Will have him monitor and would expect gradual improvement. He says he is due for a teeth cleaning so would schedule that and they can look as well if bumps are still present      Patient has been advised of split billing requirements and indicates understanding: Yes        BMI  Estimated body mass index is 31.48 kg/m  as calculated from the following:    Height as of this encounter: 1.784 m (5' 10.25\").    Weight as of this encounter: 100.2 kg (221 lb).       Counseling  Appropriate preventive services were addressed with this patient via screening, questionnaire, or discussion as appropriate for fall prevention, nutrition, physical activity, Tobacco-use cessation, social engagement, weight loss and cognition.  Checklist reviewing preventive services available has been given to the patient.  Reviewed patient's diet, addressing concerns and/or questions.           Paola Maddox is a 39 year old, presenting for the following:  Physical        10/3/2024     8:09 AM   Additional Questions   Roomed by LEANNA Merchant        Health Care Directive  Patient does not have a Health Care Directive or Living Will: Discussed advance care planning with patient; however, patient declined at this time.    HPI    -He has some bumps on his tongue that have been there for the last few months. "         10/3/2024   General Health   How would you rate your overall physical health? Good   Feel stress (tense, anxious, or unable to sleep) Not at all            10/3/2024   Nutrition   Three or more servings of calcium each day? Yes   Diet: Regular (no restrictions)   How many servings of fruit and vegetables per day? (!) 2-3   How many sweetened beverages each day? 0-1            10/3/2024   Exercise   Days per week of moderate/strenous exercise 4 days            10/3/2024   Social Factors   Frequency of gathering with friends or relatives Once a week   Worry food won't last until get money to buy more No   Food not last or not have enough money for food? No   Do you have housing? (Housing is defined as stable permanent housing and does not include staying ouside in a car, in a tent, in an abandoned building, in an overnight shelter, or couch-surfing.) Yes   Are you worried about losing your housing? No   Lack of transportation? No   Unable to get utilities (heat,electricity)? No            10/3/2024   Dental   Dentist two times every year? Yes            10/3/2024   TB Screening   Were you born outside of the [place]? No              10/3/2024   Substance Use   Alcohol more than 3/day or more than 7/wk No   Do you use any other substances recreationally? No        Social History     Tobacco Use    Smoking status: Never    Smokeless tobacco: Never   Substance Use Topics    Alcohol use: Yes     Alcohol/week: 5.0 standard drinks of alcohol     Types: 6 drink(s) per week     Comment: couple times per week    Drug use: No         10/3/2024   STI Screening   New sexual partner(s) since last STI/HIV test? No      ASCVD Risk   The 10-year ASCVD risk score (Memo MAGALLANES, et al., 2019) is: 2.4%    Values used to calculate the score:      Age: 39 years      Sex: Male      Is Non- : No      Diabetic: No      Tobacco smoker: No      Systolic Blood Pressure: 136 mmHg      Is BP treated: No      HDL  "Cholesterol: 47 mg/dL      Total Cholesterol: 268 mg/dL        10/3/2024   Contraception/Family Planning   Questions about contraception or family planning No          Reviewed and updated as needed this visit by Provider                    BP Readings from Last 3 Encounters:   10/03/24 136/82   02/14/24 (!) 143/83   12/07/23 128/70    Wt Readings from Last 3 Encounters:   10/03/24 100.2 kg (221 lb)   02/14/24 96.3 kg (212 lb 6.4 oz)   12/07/23 94.3 kg (208 lb)                      Review of Systems  CONSTITUTIONAL: NEGATIVE for fever, chills, change in weight  INTEGUMENTARY/SKIN: NEGATIVE for worrisome rashes, moles or lesions  EYES: NEGATIVE for vision changes or irritation  ENT/MOUTH: NEGATIVE for ear, mouth and throat problems  RESP: NEGATIVE for significant cough or SOB  BREAST: NEGATIVE for masses, tenderness or discharge  CV: NEGATIVE for chest pain, palpitations or peripheral edema  GI: NEGATIVE for nausea, abdominal pain, heartburn, or change in bowel habits  : NEGATIVE for frequency, dysuria, or hematuria  MUSCULOSKELETAL: NEGATIVE for significant arthralgias or myalgia  NEURO: NEGATIVE for weakness, dizziness or paresthesias  ENDOCRINE: NEGATIVE for temperature intolerance, skin/hair changes  HEME: NEGATIVE for bleeding problems  PSYCHIATRIC: NEGATIVE for changes in mood or affect     Objective    Exam  /82   Pulse 65   Temp 97  F (36.1  C) (Tympanic)   Ht 1.784 m (5' 10.25\")   Wt 100.2 kg (221 lb)   SpO2 99%   BMI 31.48 kg/m     Estimated body mass index is 31.48 kg/m  as calculated from the following:    Height as of this encounter: 1.784 m (5' 10.25\").    Weight as of this encounter: 100.2 kg (221 lb).    Physical Exam  GENERAL: alert and no distress  EYES: Eyes grossly normal to inspection, PERRL and conjunctivae and sclerae normal  HENT: ear canals and TM's normal, nose and mouth without ulcers or lesions  NECK: no adenopathy, no asymmetry, masses, or scars  RESP: lungs clear to " auscultation - no rales, rhonchi or wheezes  CV: regular rate and rhythm, normal S1 S2, no S3 or S4, no murmur, click or rub, no peripheral edema  ABDOMEN: soft, nontender, no hepatosplenomegaly, no masses and bowel sounds normal  MS: no gross musculoskeletal defects noted, no edema  SKIN: no suspicious lesions or rashes  NEURO: Normal strength and tone, mentation intact and speech normal  PSYCH: mentation appears normal, affect normal/bright        Signed Electronically by: Alice De Anda PA-C

## 2024-10-03 NOTE — PATIENT INSTRUCTIONS
Patient Education   Preventive Care Advice   This is general advice given by our system to help you stay healthy. However, your care team may have specific advice just for you. Please talk to your care team about your preventive care needs.  Nutrition  Eat 5 or more servings of fruits and vegetables each day.  Try wheat bread, brown rice and whole grain pasta (instead of white bread, rice, and pasta).  Get enough calcium and vitamin D. Check the label on foods and aim for 100% of the RDA (recommended daily allowance).  Lifestyle  Exercise at least 150 minutes each week  (30 minutes a day, 5 days a week).  Do muscle strengthening activities 2 days a week. These help control your weight and prevent disease.  No smoking.  Wear sunscreen to prevent skin cancer.  Have a dental exam and cleaning every 6 months.  Yearly exams  See your health care team every year to talk about:  Any changes in your health.  Any medicines your care team has prescribed.  Preventive care, family planning, and ways to prevent chronic diseases.  Shots (vaccines)   HPV shots (up to age 26), if you've never had them before.  Hepatitis B shots (up to age 59), if you've never had them before.  COVID-19 shot: Get this shot when it's due.  Flu shot: Get a flu shot every year.  Tetanus shot: Get a tetanus shot every 10 years.  Pneumococcal, hepatitis A, and RSV shots: Ask your care team if you need these based on your risk.  Shingles shot (for age 50 and up)  General health tests  Diabetes screening:  Starting at age 35, Get screened for diabetes at least every 3 years.  If you are younger than age 35, ask your care team if you should be screened for diabetes.  Cholesterol test: At age 39, start having a cholesterol test every 5 years, or more often if advised.  Bone density scan (DEXA): At age 50, ask your care team if you should have this scan for osteoporosis (brittle bones).  Hepatitis C: Get tested at least once in your life.  STIs (sexually  transmitted infections)  Before age 24: Ask your care team if you should be screened for STIs.  After age 24: Get screened for STIs if you're at risk. You are at risk for STIs (including HIV) if:  You are sexually active with more than one person.  You don't use condoms every time.  You or a partner was diagnosed with a sexually transmitted infection.  If you are at risk for HIV, ask about PrEP medicine to prevent HIV.  Get tested for HIV at least once in your life, whether you are at risk for HIV or not.  Cancer screening tests  Cervical cancer screening: If you have a cervix, begin getting regular cervical cancer screening tests starting at age 21.  Breast cancer scan (mammogram): If you've ever had breasts, begin having regular mammograms starting at age 40. This is a scan to check for breast cancer.  Colon cancer screening: It is important to start screening for colon cancer at age 45.  Have a colonoscopy test every 10 years (or more often if you're at risk) Or, ask your provider about stool tests like a FIT test every year or Cologuard test every 3 years.  To learn more about your testing options, visit:   .  For help making a decision, visit:   https://bit.ly/vr11881.  Prostate cancer screening test: If you have a prostate, ask your care team if a prostate cancer screening test (PSA) at age 55 is right for you.  Lung cancer screening: If you are a current or former smoker ages 50 to 80, ask your care team if ongoing lung cancer screenings are right for you.  For informational purposes only. Not to replace the advice of your health care provider. Copyright   2023 [employer]. All rights reserved. Clinically reviewed by the [facility]. [employer] [id number] - REV 01/24.

## 2024-10-07 DIAGNOSIS — E78.49 OTHER HYPERLIPIDEMIA: Primary | ICD-10-CM

## 2024-11-05 ENCOUNTER — OFFICE VISIT (OUTPATIENT)
Dept: CARDIOLOGY | Facility: CLINIC | Age: 39
End: 2024-11-05
Attending: PHYSICIAN ASSISTANT
Payer: COMMERCIAL

## 2024-11-05 VITALS
RESPIRATION RATE: 16 BRPM | SYSTOLIC BLOOD PRESSURE: 116 MMHG | WEIGHT: 222 LBS | HEIGHT: 70 IN | DIASTOLIC BLOOD PRESSURE: 80 MMHG | BODY MASS INDEX: 31.78 KG/M2 | HEART RATE: 52 BPM

## 2024-11-05 DIAGNOSIS — E78.49 OTHER HYPERLIPIDEMIA: ICD-10-CM

## 2024-11-05 DIAGNOSIS — Z82.49 FAMILY HISTORY OF ISCHEMIC HEART DISEASE: Primary | ICD-10-CM

## 2024-11-05 DIAGNOSIS — I10 HYPERTENSION, UNSPECIFIED TYPE: ICD-10-CM

## 2024-11-05 PROCEDURE — 99204 OFFICE O/P NEW MOD 45 MIN: CPT | Performed by: INTERNAL MEDICINE

## 2024-11-05 NOTE — PROGRESS NOTES
[facility] [address], [place] [address]   [email or web address]   OFFICE: [phone]        Thank you Alice De Anda PA-C, for asking the [facility] team to participate in the care of your patient, Tan Florence.     Impression and Plan     1.  Hypertension.  As noted below, Tan presents today under the impression that he was referred for evaluation of hypertension.  He is not on any medical therapy for hypertension.  I reviewed his last 5 blood pressure readings which have been favorable with the exception of a blood pressure in February that was 143/83 which appear to be an aberration.  Blood pressure today is 116/80 mmHg.  He already does have a home blood pressure monitoring device and we simply jointly decided to continue to monitor.  He also hopes to work on diet and exercise as well.    2.  Dyslipidemia. Lipid profile 13 October 2024 revealed  mg/dL and HDL 40 mg/dL.  Cass states he does have history of coronary disease in his grandparents and more extended family but not in his immediate family.  I did indicate that CT coronary calcium score evaluation could aid in being how aggressive to be with his lipid management and may identify any nascent atherosclerosis.  He would like to pursue this.  CT coronary calcium score.    Follow-up and further recommendations pending test results.    35 minutes spent reviewing prior records (including documentation, laboratory studies, cardiac testing/imaging), interview with patient along with physical exam, planning, and subsequent documentation/crafting of note).           History of Present Illness    Once again I would like to thank you again for asking me to participate in the care of your patient, Tan Florence.  As you know, but to reiterate for my own records, Tan Florence is a 39 year old male who presents today under the impression that he was referred for  "evaluation of hypertension.    On interview, Tan subjectively and denies any concerning symptoms.  He specifically denies any chest pain.  He reports no palpitations or lightheadedness.    Further review of systems is otherwise negative/noncontributory (medical record and 13 point review of systems reviewed as well and pertinent positives noted).         Cardiac Diagnostics      None         Physical Examination       /80 (BP Location: Left arm, Patient Position: Sitting, Cuff Size: Adult Large)   Pulse 52   Resp 16   Ht 1.784 m (5' 10.25\")   Wt 100.7 kg (222 lb)   BMI 31.63 kg/m          Wt Readings from Last 3 Encounters:   11/05/24 100.7 kg (222 lb)   10/03/24 100.2 kg (221 lb)   02/14/24 96.3 kg (212 lb 6.4 oz)       The patient is alert and oriented times three. Sclerae are anicteric. Mucosal membranes are moist. Jugular venous pressure is normal. No significant adenopathy/thyromegally appreciated. Lungs are clear with good expansion. On cardiovascular exam, the patient has a regular S1 and S2. Abdomen is soft and non-tender. Extremities reveal no clubbing, cyanosis, or edema.       Family History/Social History/Risk Factors   Patient does not smoke.  Family history reviewed.  He states there is a family history of coronary disease in his grandparents and more extended family though details unclear.  He works as an .         Medical History  Surgical History Family History Social History   Past Medical History:   Diagnosis Date    Gastroesophageal reflux disease     NO ACTIVE PROBLEMS 06/16/2008    Obese      Past Surgical History:   Procedure Laterality Date    ESOPHAGOSCOPY, GASTROSCOPY, DUODENOSCOPY (EGD), COMBINED N/A 4/11/2023    Procedure: ESOPHAGOGASTRODUODENOSCOPY, WITH BIOPSY;  Surgeon: Sumit David DO;  Location: [facility] OR    INCISION AND DRAINAGE TONSIL, COMBINED Left 11/8/2023    Procedure: INCISION AND DRAINAGE, ABSCESS, LEFT TONSILLAR OR PERITONSILLAR;  " Surgeon: Harris Dixon MD;  Location: [facility] OR    NO HISTORY OF SURGERY       Family History   Problem Relation Age of Onset    Asthma No family hx of     C.A.D. No family hx of     Diabetes No family hx of     Hypertension No family hx of     Cancer - colorectal No family hx of     Prostate Cancer No family hx of         Social History     Socioeconomic History    Marital status:      Spouse name: Not on file    Number of children: 0    Years of education: 14    Highest education level: Not on file   Occupational History    Occupation: [employer]er     Employer: [employer]   Tobacco Use    Smoking status: Never    Smokeless tobacco: Never   Substance and Sexual Activity    Alcohol use: Yes     Alcohol/week: 5.0 standard drinks of alcohol     Types: 6 drink(s) per week     Comment: couple times per week    Drug use: No    Sexual activity: Yes     Partners: Female     Birth control/protection: Pill   Other Topics Concern     Service Not Asked    Blood Transfusions Not Asked    Caffeine Concern Not Asked    Occupational Exposure Not Asked    Hobby Hazards Not Asked    Sleep Concern Not Asked    Stress Concern Not Asked    Weight Concern Not Asked    Special Diet Not Asked    Back Care Not Asked    Exercise Yes    Bike Helmet Not Asked    Seat Belt Yes    Self-Exams No   Social History Narrative    Not on file     Social Drivers of Health     Financial Resource Strain: Low Risk  (10/3/2024)    Financial Resource Strain     Within the past 12 months, have you or your family members you live with been unable to get utilities (heat, electricity) when it was really needed?: No   Food Insecurity: Low Risk  (10/3/2024)    Food Insecurity     Within the past 12 months, did you worry that your food would run out before you got money to buy more?: No     Within the past 12 months, did the food you bought just not last and you didn t have money to get more?: No   Transportation Needs: Low Risk  (10/3/2024)     "Transportation Needs     Within the past 12 months, has lack of transportation kept you from medical appointments, getting your medicines, non-medical meetings or appointments, work, or from getting things that you need?: No   Physical Activity: Unknown (10/3/2024)    Exercise Vital Sign     Days of Exercise per Week: 4 days     Minutes of Exercise per Session: Not on file   Stress: No Stress Concern Present (10/3/2024)    [employer] [place] [employer] - Occupational Stress Questionnaire     Feeling of Stress : Not at all   Social Connections: Unknown (10/3/2024)    Social Connection and Isolation Panel [NHANES]     Frequency of Communication with Friends and Family: Not on file     Frequency of Social Gatherings with Friends and Family: Once a week     Attends [employer] Services: Not on file     Active Member of Clubs or Organizations: Not on file     Attends Club or Organization Meetings: Not on file     Marital Status: Not on file   Interpersonal Safety: Not on file   Housing Stability: Low Risk  (10/3/2024)    Housing Stability     Do you have housing? : Yes     Are you worried about losing your housing?: No           Medications  Allergies   No current outpatient medications on file.     No Known Allergies       Lab Results    Chemistry/lipid CBC Cardiac Enzymes/BNP/TSH/INR   Recent Labs   Lab Test 10/03/24  0843   CHOL 291*   HDL 40   *   TRIG 236*     Recent Labs   Lab Test 10/03/24  0843 12/07/23  1111   * 174*     Recent Labs   Lab Test 10/03/24  0843 12/07/23  1111 11/09/23  0640   NA  --   --  140   POTASSIUM  --   --  4.3   CHLORIDE  --   --  105   CO2  --   --  26   GLC 96   < > 101*   BUN  --   --  12.3   CR  --   --  0.82   GFRESTIMATED  --   --  >90   BENJIE  --   --  8.3*    < > = values in this interval not displayed.     Recent Labs   Lab Test 11/09/23  0640 11/08/23  0041   CR 0.82 0.81     No results for input(s): \"A1C\" in the last 66101 hours.       Recent Labs   Lab Test " "11/09/23  0640   WBC 14.0*   HGB 12.3*   HCT 36.9*   MCV 85        Recent Labs   Lab Test 11/09/23  0640 11/08/23  0041   HGB 12.3* 13.6    No results for input(s): \"TROPONINI\" in the last 54360 hours.  No results for input(s): \"BNP\", \"NTBNPI\", \"NTBNP\" in the last 47874 hours.  No results for input(s): \"TSH\" in the last 96135 hours.  No results for input(s): \"INR\" in the last 80765 hours.       Medications  Allergies   No current outpatient medications on file.      No Known Allergies       Lab Results   Lab Results   Component Value Date     11/09/2023    CO2 26 11/09/2023    BUN 12.3 11/09/2023     Lab Results   Component Value Date    WBC 14.0 11/09/2023    HGB 12.3 11/09/2023    HCT 36.9 11/09/2023    MCV 85 11/09/2023     11/09/2023     Lab Results   Component Value Date    CHOL 291 10/03/2024    TRIG 236 10/03/2024    HDL 40 10/03/2024     No results found for: \"INR\"  No results found for: \"BNP\"  No results found for: \"CKTOTAL\", \"CKMB\", \"TROPONINI\"  No results found for: \"TSH\"               "

## 2024-11-05 NOTE — LETTER
11/5/2024    Alice De Anda PA-C  [address] [facility] [address]    RE: Tan Florence       Dear Colleague,     I had the pleasure of seeing Tan Florence in the [facility].         [facility]   [address], [place] [address]   [email or web address]   OFFICE: [phone]        Thank you Alice De Anda PA-C, for asking the [facility] team to participate in the care of your patient, Tan Florence.     Impression and Plan     1.  Hypertension.  As noted below, Tan presents today under the impression that he was referred for evaluation of hypertension.  He is not on any medical therapy for hypertension.  I reviewed his last 5 blood pressure readings which have been favorable with the exception of a blood pressure in February that was 143/83 which appear to be an aberration.  Blood pressure today is 116/80 mmHg.  He already does have a home blood pressure monitoring device and we simply jointly decided to continue to monitor.  He also hopes to work on diet and exercise as well.    2.  Dyslipidemia. Lipid profile 13 October 2024 revealed  mg/dL and HDL 40 mg/dL.  Cass states he does have history of coronary disease in his grandparents and more extended family but not in his immediate family.  I did indicate that CT coronary calcium score evaluation could aid in being how aggressive to be with his lipid management and may identify any nascent atherosclerosis.  He would like to pursue this.  CT coronary calcium score.    Follow-up and further recommendations pending test results.    35 minutes spent reviewing prior records (including documentation, laboratory studies, cardiac testing/imaging), interview with patient along with physical exam, planning, and subsequent documentation/crafting of note).           History of Present Illness    Once again I would like to thank you again for asking me to participate  "in the care of your patient, Tan Florence.  As you know, but to reiterate for my own records, Tan Florence is a 39 year old male who presents today under the impression that he was referred for evaluation of hypertension.    On interview, Tan subjectively and denies any concerning symptoms.  He specifically denies any chest pain.  He reports no palpitations or lightheadedness.    Further review of systems is otherwise negative/noncontributory (medical record and 13 point review of systems reviewed as well and pertinent positives noted).         Cardiac Diagnostics      None         Physical Examination       /80 (BP Location: Left arm, Patient Position: Sitting, Cuff Size: Adult Large)   Pulse 52   Resp 16   Ht 1.784 m (5' 10.25\")   Wt 100.7 kg (222 lb)   BMI 31.63 kg/m          Wt Readings from Last 3 Encounters:   11/05/24 100.7 kg (222 lb)   10/03/24 100.2 kg (221 lb)   02/14/24 96.3 kg (212 lb 6.4 oz)       The patient is alert and oriented times three. Sclerae are anicteric. Mucosal membranes are moist. Jugular venous pressure is normal. No significant adenopathy/thyromegally appreciated. Lungs are clear with good expansion. On cardiovascular exam, the patient has a regular S1 and S2. Abdomen is soft and non-tender. Extremities reveal no clubbing, cyanosis, or edema.       Family History/Social History/Risk Factors   Patient does not smoke.  Family history reviewed.  He states there is a family history of coronary disease in his grandparents and more extended family though details unclear.  He works as an .         Medical History  Surgical History Family History Social History   Past Medical History:   Diagnosis Date     Gastroesophageal reflux disease      NO ACTIVE PROBLEMS 06/16/2008     Obese      Past Surgical History:   Procedure Laterality Date     ESOPHAGOSCOPY, GASTROSCOPY, DUODENOSCOPY (EGD), COMBINED N/A 4/11/2023    Procedure: ESOPHAGOGASTRODUODENOSCOPY, WITH " BIOPSY;  Surgeon: Sumit David DO;  Location: [facility]     INCISION AND DRAINAGE TONSIL, COMBINED Left 11/8/2023    Procedure: INCISION AND DRAINAGE, ABSCESS, LEFT TONSILLAR OR PERITONSILLAR;  Surgeon: Harris Dixon MD;  Location: [facility] OR     NO HISTORY OF SURGERY       Family History   Problem Relation Age of Onset     Asthma No family hx of      C.A.D. No family hx of      Diabetes No family hx of      Hypertension No family hx of      Cancer - colorectal No family hx of      Prostate Cancer No family hx of         Social History     Socioeconomic History     Marital status:      Spouse name: Not on file     Number of children: 0     Years of education: 14     Highest education level: Not on file   Occupational History     Occupation: [employer]     Employer: [employer]   Tobacco Use     Smoking status: Never     Smokeless tobacco: Never   Substance and Sexual Activity     Alcohol use: Yes     Alcohol/week: 5.0 standard drinks of alcohol     Types: 6 drink(s) per week     Comment: couple times per week     Drug use: No     Sexual activity: Yes     Partners: Female     Birth control/protection: Pill   Other Topics Concern      Service Not Asked     Blood Transfusions Not Asked     Caffeine Concern Not Asked     Occupational Exposure Not Asked     Hobby Hazards Not Asked     Sleep Concern Not Asked     Stress Concern Not Asked     Weight Concern Not Asked     Special Diet Not Asked     Back Care Not Asked     Exercise Yes     Bike Helmet Not Asked     Seat Belt Yes     Self-Exams No   Social History Narrative     Not on file     Social Drivers of Health     Financial Resource Strain: Low Risk  (10/3/2024)    Financial Resource Strain      Within the past 12 months, have you or your family members you live with been unable to get utilities (heat, electricity) when it was really needed?: No   Food Insecurity: Low Risk  (10/3/2024)    Food Insecurity      Within the past 12 months,  did you worry that your food would run out before you got money to buy more?: No      Within the past 12 months, did the food you bought just not last and you didn t have money to get more?: No   Transportation Needs: Low Risk  (10/3/2024)    Transportation Needs      Within the past 12 months, has lack of transportation kept you from medical appointments, getting your medicines, non-medical meetings or appointments, work, or from getting things that you need?: No   Physical Activity: Unknown (10/3/2024)    Exercise Vital Sign      Days of Exercise per Week: 4 days      Minutes of Exercise per Session: Not on file   Stress: No Stress Concern Present (10/3/2024)    [employer] - Occupational Stress Questionnaire      Feeling of Stress : Not at all   Social Connections: Unknown (10/3/2024)    Social Connection and Isolation Panel [NHANES]      Frequency of Communication with Friends and Family: Not on file      Frequency of Social Gatherings with Friends and Family: Once a week      Attends Muslim Services: Not on file      Active Member of Clubs or Organizations: Not on file      Attends Club or Organization Meetings: Not on file      Marital Status: Not on file   Interpersonal Safety: Not on file   Housing Stability: Low Risk  (10/3/2024)    Housing Stability      Do you have housing? : Yes      Are you worried about losing your housing?: No           Medications  Allergies   No current outpatient medications on file.     No Known Allergies       Lab Results    Chemistry/lipid CBC Cardiac Enzymes/BNP/TSH/INR   Recent Labs   Lab Test 10/03/24  0843   CHOL 291*   HDL 40   *   TRIG 236*     Recent Labs   Lab Test 10/03/24  0843 12/07/23  1111   * 174*     Recent Labs   Lab Test 10/03/24  0843 12/07/23  1111 11/09/23  0640   NA  --   --  140   POTASSIUM  --   --  4.3   CHLORIDE  --   --  105   CO2  --   --  26   GLC 96   < > 101*   BUN  --   --  12.3   CR  --   --  0.82  "  GFRESTIMATED  --   --  >90   BENJIE  --   --  8.3*    < > = values in this interval not displayed.     Recent Labs   Lab Test 11/09/23  0640 11/08/23 0041   CR 0.82 0.81     No results for input(s): \"A1C\" in the last 93512 hours.       Recent Labs   Lab Test 11/09/23 0640   WBC 14.0*   HGB 12.3*   HCT 36.9*   MCV 85        Recent Labs   Lab Test 11/09/23  0640 11/08/23  0041   HGB 12.3* 13.6    No results for input(s): \"TROPONINI\" in the last 47370 hours.  No results for input(s): \"BNP\", \"NTBNPI\", \"NTBNP\" in the last 16934 hours.  No results for input(s): \"TSH\" in the last 88102 hours.  No results for input(s): \"INR\" in the last 60195 hours.       Medications  Allergies   No current outpatient medications on file.      No Known Allergies       Lab Results   Lab Results   Component Value Date     11/09/2023    CO2 26 11/09/2023    BUN 12.3 11/09/2023     Lab Results   Component Value Date    WBC 14.0 11/09/2023    HGB 12.3 11/09/2023    HCT 36.9 11/09/2023    MCV 85 11/09/2023     11/09/2023     Lab Results   Component Value Date    CHOL 291 10/03/2024    TRIG 236 10/03/2024    HDL 40 10/03/2024     No results found for: \"INR\"  No results found for: \"BNP\"  No results found for: \"CKTOTAL\", \"CKMB\", \"TROPONINI\"  No results found for: \"TSH\"                   Thank you for allowing me to participate in the care of your patient.      Sincerely,     Dilma Mcgee MD     [facility]  cc:   Alice De Anda, JOSÉ MIGUEL  [address] [place] [address]      "

## 2024-11-14 ENCOUNTER — HOSPITAL ENCOUNTER (OUTPATIENT)
Dept: CT IMAGING | Facility: CLINIC | Age: 39
Discharge: HOME | End: 2024-11-14
Attending: INTERNAL MEDICINE
Payer: COMMERCIAL

## 2024-11-14 DIAGNOSIS — E78.49 OTHER HYPERLIPIDEMIA: ICD-10-CM

## 2024-11-14 DIAGNOSIS — Z82.49 FAMILY HISTORY OF ISCHEMIC HEART DISEASE: ICD-10-CM

## 2024-11-14 PROCEDURE — 75571 CT HRT W/O DYE W/CA TEST: CPT

## 2024-11-14 PROCEDURE — 75571 CT HRT W/O DYE W/CA TEST: CPT | Mod: 26 | Performed by: INTERNAL MEDICINE

## 2024-11-15 LAB
CV CALCIUM SCORE AGATSTON LM: 0
CV CALCIUM SCORING AGATSON LAD: 0
CV CALCIUM SCORING AGATSTON CX: 0
CV CALCIUM SCORING AGATSTON RCA: 0
CV CALCIUM SCORING AGATSTON TOTAL: 0

## 2024-12-30 ENCOUNTER — OFFICE VISIT (OUTPATIENT)
Dept: INTERNAL MEDICINE | Facility: CLINIC | Age: 39
End: 2024-12-30
Payer: COMMERCIAL

## 2024-12-30 ENCOUNTER — HOSPITAL ENCOUNTER (OUTPATIENT)
Dept: CT IMAGING | Facility: HOSPITAL | Age: 39
Discharge: HOME | End: 2024-12-30
Attending: STUDENT IN AN ORGANIZED HEALTH CARE EDUCATION/TRAINING PROGRAM | Admitting: STUDENT IN AN ORGANIZED HEALTH CARE EDUCATION/TRAINING PROGRAM
Payer: COMMERCIAL

## 2024-12-30 ENCOUNTER — HOSPITAL ENCOUNTER (EMERGENCY)
Facility: HOSPITAL | Age: 39
Discharge: LEFT BEFORE DISCHARGE | End: 2024-12-30
Payer: COMMERCIAL

## 2024-12-30 VITALS
WEIGHT: 229.2 LBS | TEMPERATURE: 99.1 F | SYSTOLIC BLOOD PRESSURE: 137 MMHG | HEIGHT: 70 IN | OXYGEN SATURATION: 97 % | BODY MASS INDEX: 32.81 KG/M2 | RESPIRATION RATE: 12 BRPM | HEART RATE: 106 BPM | DIASTOLIC BLOOD PRESSURE: 92 MMHG

## 2024-12-30 DIAGNOSIS — R10.31 RLQ ABDOMINAL PAIN: Primary | ICD-10-CM

## 2024-12-30 DIAGNOSIS — R10.31 ABDOMINAL PAIN, RIGHT LOWER QUADRANT: Primary | ICD-10-CM

## 2024-12-30 DIAGNOSIS — R10.31 RLQ ABDOMINAL PAIN: ICD-10-CM

## 2024-12-30 LAB
ALBUMIN SERPL BCG-MCNC: 4.7 G/DL (ref 3.5–5.2)
ALBUMIN UR-MCNC: NEGATIVE MG/DL
ALP SERPL-CCNC: 71 U/L (ref 40–150)
ALT SERPL W P-5'-P-CCNC: 35 U/L (ref 0–70)
ANION GAP SERPL CALCULATED.3IONS-SCNC: 12 MMOL/L (ref 7–15)
APPEARANCE UR: CLEAR
AST SERPL W P-5'-P-CCNC: 17 U/L (ref 0–45)
BILIRUB SERPL-MCNC: 1 MG/DL
BILIRUB UR QL STRIP: NEGATIVE
BUN SERPL-MCNC: 14.3 MG/DL (ref 6–20)
CALCIUM SERPL-MCNC: 9.5 MG/DL (ref 8.8–10.4)
CHLORIDE SERPL-SCNC: 101 MMOL/L (ref 98–107)
COLOR UR AUTO: YELLOW
CREAT SERPL-MCNC: 1.13 MG/DL (ref 0.67–1.17)
EGFRCR SERPLBLD CKD-EPI 2021: 84 ML/MIN/1.73M2
ERYTHROCYTE [DISTWIDTH] IN BLOOD BY AUTOMATED COUNT: 12.9 % (ref 10–15)
GLUCOSE SERPL-MCNC: 95 MG/DL (ref 70–99)
GLUCOSE UR STRIP-MCNC: NEGATIVE MG/DL
HCO3 SERPL-SCNC: 24 MMOL/L (ref 22–29)
HCT VFR BLD AUTO: 43 % (ref 40–53)
HGB BLD-MCNC: 14.7 G/DL (ref 13.3–17.7)
HGB UR QL STRIP: NEGATIVE
KETONES UR STRIP-MCNC: NEGATIVE MG/DL
LEUKOCYTE ESTERASE UR QL STRIP: NEGATIVE
LIPASE SERPL-CCNC: 46 U/L (ref 13–60)
MCH RBC QN AUTO: 28.6 PG (ref 26.5–33)
MCHC RBC AUTO-ENTMCNC: 34.2 G/DL (ref 31.5–36.5)
MCV RBC AUTO: 84 FL (ref 78–100)
NITRATE UR QL: NEGATIVE
PH UR STRIP: 6 [PH] (ref 5–8)
PLATELET # BLD AUTO: 227 10E3/UL (ref 150–450)
POTASSIUM SERPL-SCNC: 4.2 MMOL/L (ref 3.4–5.3)
PROT SERPL-MCNC: 7.5 G/DL (ref 6.4–8.3)
RBC # BLD AUTO: 5.14 10E6/UL (ref 4.4–5.9)
SODIUM SERPL-SCNC: 137 MMOL/L (ref 135–145)
SP GR UR STRIP: 1.01 (ref 1–1.03)
UROBILINOGEN UR STRIP-ACNC: 0.2 E.U./DL
WBC # BLD AUTO: 9.8 10E3/UL (ref 4–11)

## 2024-12-30 PROCEDURE — 36415 COLL VENOUS BLD VENIPUNCTURE: CPT | Performed by: INTERNAL MEDICINE

## 2024-12-30 PROCEDURE — G2211 COMPLEX E/M VISIT ADD ON: HCPCS | Performed by: INTERNAL MEDICINE

## 2024-12-30 PROCEDURE — 83690 ASSAY OF LIPASE: CPT | Performed by: INTERNAL MEDICINE

## 2024-12-30 PROCEDURE — 81003 URINALYSIS AUTO W/O SCOPE: CPT | Performed by: INTERNAL MEDICINE

## 2024-12-30 PROCEDURE — 99214 OFFICE O/P EST MOD 30 MIN: CPT | Performed by: INTERNAL MEDICINE

## 2024-12-30 PROCEDURE — 74177 CT ABD & PELVIS W/CONTRAST: CPT

## 2024-12-30 PROCEDURE — 250N000009 HC RX 250: Performed by: STUDENT IN AN ORGANIZED HEALTH CARE EDUCATION/TRAINING PROGRAM

## 2024-12-30 PROCEDURE — 80053 COMPREHEN METABOLIC PANEL: CPT | Performed by: INTERNAL MEDICINE

## 2024-12-30 PROCEDURE — 85027 COMPLETE CBC AUTOMATED: CPT | Performed by: INTERNAL MEDICINE

## 2024-12-30 PROCEDURE — 250N000011 HC RX IP 250 OP 636: Performed by: STUDENT IN AN ORGANIZED HEALTH CARE EDUCATION/TRAINING PROGRAM

## 2024-12-30 RX ORDER — IOPAMIDOL 755 MG/ML
90 INJECTION, SOLUTION INTRAVASCULAR ONCE
Status: COMPLETED | OUTPATIENT
Start: 2024-12-30 | End: 2024-12-30

## 2024-12-30 RX ADMIN — SODIUM CHLORIDE 90 ML: 9 INJECTION, SOLUTION INTRAVENOUS at 18:55

## 2024-12-30 RX ADMIN — IOPAMIDOL 90 ML: 755 INJECTION, SOLUTION INTRAVENOUS at 18:54

## 2024-12-30 ASSESSMENT — ENCOUNTER SYMPTOMS: ABDOMINAL PAIN: 1

## 2024-12-30 ASSESSMENT — PAIN SCALES - GENERAL: PAINLEVEL_OUTOF10: EXTREME PAIN (9)

## 2024-12-30 NOTE — PROGRESS NOTES
Imaging ordered on behalf of colleague who is gone for the day     Per the note, concern for appendicitis     Imaging requesting stat orders. Placed

## 2024-12-30 NOTE — PROGRESS NOTES
"Assessment/Plan:    (R10.31) Abdominal pain, right lower quadrant  (primary encounter diagnosis)  Comment: This also included feeling fatigue body aches loose stools.  Pain in the right lower abdomen.  Associated symptoms include feeling chilled no vomiting no prior abdominal surgeries.  Plan: CBC with platelets, Comprehensive metabolic         panel, UA Macroscopic with reflex to         Microscopic and Culture, Lipase, CT Abdomen         Pelvis w/o Contrast        Possible appendicitis rule out gastroenteritis especially with loose stools preceding or as part of the initial symptomatology.  Accompanied today by his wife who is supportive she is also pregnant.        25 minutes spent on the date of the encounter doing chart review, review of test results, interpretation of tests, patient visit, documentation, and discussion with family     Subjective:  Tan Florence is a 39 year old male presents for the following health issues erik consult with the patient the importance of seeking out emergency room consultation if symptoms worsen during the night.    ROS:  Loose stools associated with some abdominal pain med list reviewed reconciled in the chart.  The patient has had low-grade fever at 99.1 with associated chills.  Mild elevation in blood pressure noted as well.    Objective:  BP (!) 137/92 (BP Location: Right arm, Patient Position: Sitting, Cuff Size: Adult Large)   Pulse 106   Temp 99.1  F (37.3  C) (Oral)   Resp 12   Ht 1.784 m (5' 10.25\")   Wt 104 kg (229 lb 3.2 oz)   SpO2 97%   BMI 32.65 kg/m    Chest clear heart tones normal abdomen obese BMI elevated at 33 extremities free of edema cyanosis or clubbing no carotid bruits or thyromegaly he appeared well nontoxic his pulse was 106 his blood pressure was elevated 137/92 he was febrile.  Generalized body aches were also noted by the patient the abdomen revealed hypoactive bowel sounds there was generalized tenderness but this was not an acute " abdominal process there was no definite peritoneal signs.  There was no tenderness at McBurney's point or in her was no Kharrazi a sign.  No tenderness in the right upper quadrant he was nonicteric.    Abiodun Borrero MD  Internal Medicine    The longitudinal plan of care for the diagnosis(es)/condition(s) as documented were addressed during this visit. Due to the added complexity in care, I will continue to support Tan in the subsequent management and with ongoing continuity of care.      Answers submitted by the patient for this visit:  General Questionnaire (Submitted on 12/30/2024)  Chief Complaint: Chronic problems general questions HPI Form  How many days per week do you miss taking your medication?: 0  General Concern (Submitted on 12/30/2024)  Chief Complaint: Chronic problems general questions HPI Form  What is the reason for your visit today?: abdominal pain, body aches  When did your symptoms begin?: 1-3 days ago  What are your symptoms?: pain in my stomach and more severe when i move or touch it.along with body aches, chills and conctipation and diarreha  How would you describe these symptoms?: Severe  Are your symptoms:: Worsening  Have you had these symptoms before?: No  Is there anything that makes you feel worse?: moving  Is there anything that makes you feel better?: not moving but marginally better  Questionnaire about: Chronic problems general questions HPI Form (Submitted on 12/30/2024)  Chief Complaint: Chronic problems general questions HPI Form

## 2024-12-30 NOTE — PROGRESS NOTES
"  {PROVIDER CHARTING PREFERENCE:511440}    Subjective   Tan is a 39 year old, presenting for the following health issues:  Abdominal Pain (Lower left quadrant. Radiates to Lower right quadrant. Still has appendix. ) and Generalized Body Aches        12/30/2024     3:56 PM   Additional Questions   Roomed by ANGÉLICA Basurto   Accompanied by ANGEL     History of Present Illness       Reason for visit:  Abdominal pain, body aches  Symptom onset:  1-3 days ago  Symptoms include:  Pain in my stomach and more severe when i move or touch it.along with body aches, chills and conctipation and diarreha  Symptom intensity:  Severe  Symptom progression:  Worsening  Had these symptoms before:  No  What makes it worse:  Moving  What makes it better:  Not moving but marginally better   He is taking medications regularly.       {MA/LPN/RN Pre-Provider Visit Orders- hCG/UA/Strep (Optional):673155}  {SUPERLIST (Optional):886213}  {additonal problems for provider to add (Optional):037797}    {ROS Picklists (Optional):621788}      Objective    BP (!) 137/92 (BP Location: Right arm, Patient Position: Sitting, Cuff Size: Adult Large)   Pulse 106   Temp 99.1  F (37.3  C) (Oral)   Resp 12   Ht 1.784 m (5' 10.25\")   Wt 104 kg (229 lb 3.2 oz)   SpO2 97%   BMI 32.65 kg/m    Body mass index is 32.65 kg/m .  Physical Exam   {Exam List (Optional):855526}    {Diagnostic Test Results (Optional):543013}        Signed Electronically by: Abiodun Borrero MD  {Email feedback regarding this note to [email or web address]   :827135}  "

## 2025-09-03 ENCOUNTER — PATIENT OUTREACH (OUTPATIENT)
Dept: CARE COORDINATION | Facility: CLINIC | Age: 40
End: 2025-09-03
Payer: COMMERCIAL

## (undated) DEVICE — PLATE GROUNDING ADULT W/CORD 9165L

## (undated) DEVICE — SYR EAR 3OZ BULB IRR STRL DISP BLU PVC 4173

## (undated) DEVICE — TUBING SUCTION MEDI-VAC 1/4"X20' N620A

## (undated) DEVICE — DRAPE U SPLIT 74X120" 29440

## (undated) DEVICE — SU CHROMIC 4-0 SH 27" G121H

## (undated) DEVICE — SOL NACL 0.9% INJ 500ML BAG 2B1323Q

## (undated) DEVICE — ESU SUCTION CAUTERY 10FR FOOT CONTROL E2505-10FR

## (undated) DEVICE — SOL WATER IRRIG 1000ML BOTTLE 2F7114

## (undated) DEVICE — CUSTOM PACK MINOR SBA5BMNHEA

## (undated) DEVICE — ESU CORD BIPOLAR 12' E0512

## (undated) DEVICE — JELLY LUBRICATING SURGILUBE 2OZ TUBE

## (undated) DEVICE — GLOVE SURG PI ULTRA TOUCH M SZ 7-1/2 LF

## (undated) DEVICE — SPONGE TONSIL 1IN MED STRL 7201

## (undated) DEVICE — SOL NACL 0.9% IRRIG 1000ML BOTTLE 2F7124

## (undated) DEVICE — GLOVE BIOGEL PI SZ 7.5 40875

## (undated) DEVICE — DRAPE SLEEVE 599

## (undated) DEVICE — ANTIFOG SOLUTION W/FOAM PAD 31142527

## (undated) DEVICE — SUCTION TIP YANKAUER W/O VENT K86

## (undated) RX ORDER — FENTANYL CITRATE 50 UG/ML
INJECTION, SOLUTION INTRAMUSCULAR; INTRAVENOUS
Status: DISPENSED
Start: 2023-11-08

## (undated) RX ORDER — LIDOCAINE HYDROCHLORIDE AND EPINEPHRINE 10; 10 MG/ML; UG/ML
INJECTION, SOLUTION INFILTRATION; PERINEURAL
Status: DISPENSED
Start: 2023-11-08

## (undated) RX ORDER — OXYMETAZOLINE HYDROCHLORIDE 0.05 G/100ML
SPRAY NASAL
Status: DISPENSED
Start: 2023-11-08